# Patient Record
Sex: FEMALE | Race: WHITE | NOT HISPANIC OR LATINO | Employment: OTHER | URBAN - METROPOLITAN AREA
[De-identification: names, ages, dates, MRNs, and addresses within clinical notes are randomized per-mention and may not be internally consistent; named-entity substitution may affect disease eponyms.]

---

## 2017-05-31 ENCOUNTER — OFFICE VISIT (OUTPATIENT)
Dept: INTERNAL MEDICINE | Facility: CLINIC | Age: 67
End: 2017-05-31

## 2017-05-31 VITALS
HEART RATE: 71 BPM | SYSTOLIC BLOOD PRESSURE: 132 MMHG | DIASTOLIC BLOOD PRESSURE: 86 MMHG | RESPIRATION RATE: 18 BRPM | HEIGHT: 65 IN | WEIGHT: 119 LBS | BODY MASS INDEX: 19.83 KG/M2 | OXYGEN SATURATION: 99 %

## 2017-05-31 DIAGNOSIS — R39.9 UTI SYMPTOMS: Primary | ICD-10-CM

## 2017-05-31 DIAGNOSIS — Z12.31 ENCOUNTER FOR SCREENING MAMMOGRAM FOR BREAST CANCER: ICD-10-CM

## 2017-05-31 DIAGNOSIS — K64.0 FIRST DEGREE HEMORRHOIDS: ICD-10-CM

## 2017-05-31 DIAGNOSIS — K62.89 RECTAL PAIN: ICD-10-CM

## 2017-05-31 PROBLEM — E55.9 AVITAMINOSIS D: Status: ACTIVE | Noted: 2017-05-31

## 2017-05-31 PROBLEM — E78.5 HLD (HYPERLIPIDEMIA): Status: ACTIVE | Noted: 2017-05-31

## 2017-05-31 PROBLEM — K64.9 HEMORRHOID: Status: ACTIVE | Noted: 2017-05-31

## 2017-05-31 LAB
BILIRUB BLD-MCNC: NEGATIVE MG/DL
CLARITY, POC: CLEAR
COLOR UR: ABNORMAL
GLUCOSE UR STRIP-MCNC: NEGATIVE MG/DL
KETONES UR QL: NEGATIVE
LEUKOCYTE EST, POC: NEGATIVE
NITRITE UR-MCNC: NEGATIVE MG/ML
PH UR: 5.5 [PH] (ref 5–8)
PROT UR STRIP-MCNC: ABNORMAL MG/DL
RBC # UR STRIP: NEGATIVE /UL
SP GR UR: 1.03 (ref 1–1.03)
UROBILINOGEN UR QL: NORMAL

## 2017-05-31 PROCEDURE — 81003 URINALYSIS AUTO W/O SCOPE: CPT | Performed by: INTERNAL MEDICINE

## 2017-05-31 PROCEDURE — 99214 OFFICE O/P EST MOD 30 MIN: CPT | Performed by: INTERNAL MEDICINE

## 2017-05-31 RX ORDER — NITROFURANTOIN 25; 75 MG/1; MG/1
CAPSULE ORAL
COMMUNITY
Start: 2017-05-25 | End: 2019-02-19

## 2017-05-31 RX ORDER — ONDANSETRON 4 MG/1
TABLET, FILM COATED ORAL
COMMUNITY
Start: 2017-04-21 | End: 2019-02-19

## 2017-06-02 LAB
BACTERIA UR CULT: NORMAL
BACTERIA UR CULT: NORMAL

## 2017-06-05 ENCOUNTER — TELEPHONE (OUTPATIENT)
Dept: INTERNAL MEDICINE | Facility: CLINIC | Age: 67
End: 2017-06-05

## 2017-06-05 NOTE — TELEPHONE ENCOUNTER
----- Message from Judy Sandoval MD sent at 6/4/2017  5:45 PM EDT -----  Please call - her urine culture is negative.

## 2017-06-14 DIAGNOSIS — Z23 NEED FOR SHINGLES VACCINE: Primary | ICD-10-CM

## 2017-06-14 DIAGNOSIS — Z00.00 HEALTH CARE MAINTENANCE: ICD-10-CM

## 2017-06-14 PROCEDURE — 90471 IMMUNIZATION ADMIN: CPT | Performed by: INTERNAL MEDICINE

## 2017-06-14 PROCEDURE — 90736 HZV VACCINE LIVE SUBQ: CPT | Performed by: INTERNAL MEDICINE

## 2017-06-15 ENCOUNTER — RESULTS ENCOUNTER (OUTPATIENT)
Dept: INTERNAL MEDICINE | Facility: CLINIC | Age: 67
End: 2017-06-15

## 2017-06-15 DIAGNOSIS — R39.9 UTI SYMPTOMS: ICD-10-CM

## 2017-06-18 LAB
APPEARANCE UR: CLEAR
BACTERIA #/AREA URNS HPF: NORMAL /HPF
BACTERIA UR CULT: ABNORMAL
BACTERIA UR CULT: ABNORMAL
BILIRUB UR QL STRIP: NEGATIVE
COLOR UR: YELLOW
EPI CELLS #/AREA URNS HPF: NORMAL /HPF
GLUCOSE UR QL: NEGATIVE
HGB UR QL STRIP: NEGATIVE
KETONES UR QL STRIP: NEGATIVE
LEUKOCYTE ESTERASE UR QL STRIP: ABNORMAL
MICRO URNS: ABNORMAL
MUCOUS THREADS URNS QL MICRO: PRESENT /HPF
NITRITE UR QL STRIP: NEGATIVE
OTHER ANTIBIOTIC SUSC ISLT: ABNORMAL
PH UR STRIP: 6 [PH] (ref 5–7.5)
PROT UR QL STRIP: NEGATIVE
RBC #/AREA URNS HPF: NORMAL /HPF
SP GR UR: 1.02 (ref 1–1.03)
URINALYSIS REFLEX: ABNORMAL
UROBILINOGEN UR STRIP-MCNC: 0.2 MG/DL (ref 0.2–1)
WBC #/AREA URNS HPF: NORMAL /HPF

## 2017-06-19 ENCOUNTER — TELEPHONE (OUTPATIENT)
Dept: INTERNAL MEDICINE | Facility: CLINIC | Age: 67
End: 2017-06-19

## 2017-06-19 NOTE — TELEPHONE ENCOUNTER
----- Message from Judy Sandoval MD sent at 6/19/2017  8:26 AM EDT -----  Please call - Her urine was negative for protein.

## 2017-07-21 ENCOUNTER — TELEPHONE (OUTPATIENT)
Dept: INTERNAL MEDICINE | Facility: CLINIC | Age: 67
End: 2017-07-21

## 2017-07-21 NOTE — TELEPHONE ENCOUNTER
3 days ago she was dizzy when she got out of bed, as the day went on it went away.   Has happened the last three nights. As she gets up she can't turn her head without feeling dizzy.     Pt stated that she was outside and doing some swimming and feels like she might have slight congestion/maybe seasonal allergy. Feels her head is stopped up. Recommended a nasal spray with an otc sinus pressure/congestion med. Told to try that over the weekend and if she still feels dizzy in the morning on Monday to give us a call and we would get her an OV scheduled.

## 2018-01-08 ENCOUNTER — TRANSCRIBE ORDERS (OUTPATIENT)
Dept: ADMINISTRATIVE | Facility: HOSPITAL | Age: 68
End: 2018-01-08

## 2018-01-08 DIAGNOSIS — Z12.39 ENCOUNTER FOR SCREENING BREAST EXAMINATION: Primary | ICD-10-CM

## 2018-01-16 ENCOUNTER — CLINICAL SUPPORT (OUTPATIENT)
Dept: INTERNAL MEDICINE | Facility: CLINIC | Age: 68
End: 2018-01-16

## 2018-01-16 DIAGNOSIS — E78.2 MIXED HYPERLIPIDEMIA: ICD-10-CM

## 2018-01-16 DIAGNOSIS — Z00.00 HEALTH CARE MAINTENANCE: Primary | ICD-10-CM

## 2018-01-16 DIAGNOSIS — Z78.0 POSTMENOPAUSAL: Primary | ICD-10-CM

## 2018-01-16 DIAGNOSIS — Z11.59 NEED FOR HEPATITIS C SCREENING TEST: ICD-10-CM

## 2018-01-16 PROCEDURE — 77080 DXA BONE DENSITY AXIAL: CPT | Performed by: INTERNAL MEDICINE

## 2018-01-19 LAB
ALBUMIN SERPL-MCNC: 4.7 G/DL (ref 3.5–5.2)
ALBUMIN/GLOB SERPL: 1.6 G/DL
ALP SERPL-CCNC: 72 U/L (ref 39–117)
ALT SERPL-CCNC: 16 U/L (ref 1–33)
APPEARANCE UR: CLEAR
AST SERPL-CCNC: 21 U/L (ref 1–32)
BACTERIA #/AREA URNS HPF: ABNORMAL /HPF
BACTERIA UR CULT: ABNORMAL
BASOPHILS # BLD MANUAL: 0 10*3/MM3 (ref 0–0.2)
BASOPHILS NFR BLD MANUAL: 0 % (ref 0–1.5)
BILIRUB SERPL-MCNC: 0.5 MG/DL (ref 0.1–1.2)
BILIRUB UR QL STRIP: NEGATIVE
BUN SERPL-MCNC: 17 MG/DL (ref 8–23)
BUN/CREAT SERPL: 19.8 (ref 7–25)
CALCIUM SERPL-MCNC: 9.5 MG/DL (ref 8.6–10.5)
CHLORIDE SERPL-SCNC: 103 MMOL/L (ref 98–107)
CHOLEST SERPL-MCNC: 223 MG/DL (ref 0–200)
CO2 SERPL-SCNC: 24.5 MMOL/L (ref 22–29)
COLOR UR: YELLOW
CREAT SERPL-MCNC: 0.86 MG/DL (ref 0.57–1)
DIFFERENTIAL COMMENT: ABNORMAL
EOSINOPHIL # BLD MANUAL: 0.29 10*3/MM3 (ref 0–0.7)
EOSINOPHIL NFR BLD MANUAL: 4 % (ref 0.3–6.2)
EPI CELLS #/AREA URNS HPF: ABNORMAL /HPF
ERYTHROCYTE [DISTWIDTH] IN BLOOD BY AUTOMATED COUNT: 13.3 % (ref 11.7–13)
GLOBULIN SER CALC-MCNC: 2.9 GM/DL
GLUCOSE SERPL-MCNC: 96 MG/DL (ref 65–99)
GLUCOSE UR QL: NEGATIVE
HCT VFR BLD AUTO: 42.4 % (ref 35.6–45.5)
HCV AB S/CO SERPL IA: 0.1 S/CO RATIO (ref 0–0.9)
HDLC SERPL-MCNC: 67 MG/DL (ref 40–60)
HGB BLD-MCNC: 14.3 G/DL (ref 11.9–15.5)
HGB UR QL STRIP: NEGATIVE
KETONES UR QL STRIP: NEGATIVE
LDLC SERPL CALC-MCNC: 143 MG/DL (ref 0–100)
LEUKOCYTE ESTERASE UR QL STRIP: ABNORMAL
LYMPHOCYTES # BLD MANUAL: 2.97 10*3/MM3 (ref 0.9–4.8)
LYMPHOCYTES NFR BLD MANUAL: 41 % (ref 19.6–45.3)
MCH RBC QN AUTO: 31.9 PG (ref 26.9–32)
MCHC RBC AUTO-ENTMCNC: 33.7 G/DL (ref 32.4–36.3)
MCV RBC AUTO: 94.6 FL (ref 80.5–98.2)
MICRO URNS: ABNORMAL
MONOCYTES # BLD MANUAL: 0.07 10*3/MM3 (ref 0.2–1.2)
MONOCYTES NFR BLD MANUAL: 1 % (ref 5–12)
MUCOUS THREADS URNS QL MICRO: PRESENT /HPF
NEUTROPHILS # BLD MANUAL: 3.92 10*3/MM3 (ref 1.9–8.1)
NEUTROPHILS NFR BLD MANUAL: 54 % (ref 42.7–76)
NITRITE UR QL STRIP: NEGATIVE
OTHER ANTIBIOTIC SUSC ISLT: ABNORMAL
PH UR STRIP: 5 [PH] (ref 5–7.5)
PLATELET # BLD AUTO: 284 10*3/MM3 (ref 140–500)
PLATELET BLD QL SMEAR: ABNORMAL
POTASSIUM SERPL-SCNC: 4.5 MMOL/L (ref 3.5–5.2)
PROT SERPL-MCNC: 7.6 G/DL (ref 6–8.5)
PROT UR QL STRIP: NEGATIVE
RBC # BLD AUTO: 4.48 10*6/MM3 (ref 3.9–5.2)
RBC #/AREA URNS HPF: ABNORMAL /HPF
RBC MORPH BLD: ABNORMAL
SODIUM SERPL-SCNC: 143 MMOL/L (ref 136–145)
SP GR UR: 1.03 (ref 1–1.03)
TRIGL SERPL-MCNC: 64 MG/DL (ref 0–150)
TSH SERPL DL<=0.005 MIU/L-ACNC: 2.74 MIU/ML (ref 0.27–4.2)
URINALYSIS REFLEX: ABNORMAL
UROBILINOGEN UR STRIP-MCNC: 0.2 MG/DL (ref 0.2–1)
VLDLC SERPL CALC-MCNC: 12.8 MG/DL (ref 5–40)
WBC # BLD AUTO: 7.25 10*3/MM3 (ref 4.5–10.7)
WBC #/AREA URNS HPF: ABNORMAL /HPF

## 2018-01-23 ENCOUNTER — OFFICE VISIT (OUTPATIENT)
Dept: INTERNAL MEDICINE | Facility: CLINIC | Age: 68
End: 2018-01-23

## 2018-01-23 VITALS
HEART RATE: 64 BPM | WEIGHT: 122 LBS | DIASTOLIC BLOOD PRESSURE: 70 MMHG | RESPIRATION RATE: 18 BRPM | BODY MASS INDEX: 20.83 KG/M2 | OXYGEN SATURATION: 99 % | SYSTOLIC BLOOD PRESSURE: 122 MMHG | HEIGHT: 64 IN

## 2018-01-23 DIAGNOSIS — Z83.2 FAMILY HISTORY OF FACTOR V DEFICIENCY: ICD-10-CM

## 2018-01-23 DIAGNOSIS — Z00.00 HEALTH MAINTENANCE EXAMINATION: Primary | ICD-10-CM

## 2018-01-23 DIAGNOSIS — R79.89 ABNORMAL CBC: ICD-10-CM

## 2018-01-23 PROCEDURE — 99397 PER PM REEVAL EST PAT 65+ YR: CPT | Performed by: INTERNAL MEDICINE

## 2018-01-23 RX ORDER — LIFITEGRAST 50 MG/ML
SOLUTION/ DROPS OPHTHALMIC
Refills: 0 | COMMUNITY
Start: 2017-12-10 | End: 2019-02-19

## 2018-01-23 NOTE — PROGRESS NOTES
Chief Complaint  Lori Tony is a 67 y.o. female who presents for Annual Exam (CPE)  .    History of Present Illness   Lori is here for routine CPE.  No new concerns.    Mammo:3/28/15 she has one scheduled on Saturday  Pap: n/a  DEXA: 1/2018  C-scope:10/2014 due 2019  Flu shot - declines  Pneumonia shots - declines  She is not big on shots.  She tries to avoid doctors.  She doesn't do regular mammograms.      Exercise: power yoga, tennis numerous days a week, swims, walk/runs    Immunization History   Administered Date(s) Administered   • Tdap 01/01/2010   • Zoster 06/14/2017       Review of Systems   Constitution: Negative for chills, fever and malaise/fatigue.   HENT: Negative for hearing loss, hoarse voice and sore throat.    Eyes: Negative for blurred vision, double vision and visual disturbance.   Cardiovascular: Negative for chest pain, leg swelling and palpitations.   Respiratory: Negative for cough and shortness of breath.    Endocrine: Negative for polydipsia and polyuria.   Hematologic/Lymphatic: Does not bruise/bleed easily.   Skin: Negative for rash and suspicious lesions.   Musculoskeletal: Negative for arthritis and myalgias.   Gastrointestinal: Negative for bloating, abdominal pain, change in bowel habit, constipation, diarrhea, dysphagia, hematochezia, melena, nausea and vomiting.   Genitourinary: Negative for dysuria and hematuria.   Neurological: Negative for dizziness, headaches and light-headedness.   Psychiatric/Behavioral: Negative for depression. The patient is not nervous/anxious.        Patient Active Problem List   Diagnosis   • HLD (hyperlipidemia)   • Avitaminosis D   • Rectal pain   • Hemorrhoid       No past medical history on file.    No past surgical history on file.    Family History   Problem Relation Age of Onset   • Dementia Mother    • Hypothyroidism Mother    • Heart disease Mother      pacemaker   • Deep vein thrombosis Father    • Dementia Father    • Glaucoma Father    •  "Macular degeneration Father    • Factor V Leiden deficiency Sister    • Deep vein thrombosis Sister      unprovoked   • Heart disease Maternal Grandmother      MI at 73   • Factor V Leiden deficiency Sister    • Glaucoma Sister        Social History     Social History   • Marital status:      Spouse name: N/A   • Number of children: 1   • Years of education: N/A     Occupational History   • Not on file.     Social History Main Topics   • Smoking status: Never Smoker   • Smokeless tobacco: Never Used   • Alcohol use Yes      Comment: occasionally   • Drug use: No   • Sexual activity: Not on file     Other Topics Concern   • Not on file     Social History Narrative   • No narrative on file       Current Outpatient Prescriptions on File Prior to Visit   Medication Sig Dispense Refill   • hydrocortisone (PROCTOSOL HC) 2.5 % rectal cream Insert  into the rectum 2 (Two) Times a Day. 28.35 g 0   • nitrofurantoin, macrocrystal-monohydrate, (MACROBID) 100 MG capsule      • ondansetron (ZOFRAN) 4 MG tablet        No current facility-administered medications on file prior to visit.        Allergies   Allergen Reactions   • Carbocaine Preservative-Free  [Mepivacaine]    • Sulfa Antibiotics        Vitals:    01/23/18 1249   BP: 122/70   Pulse: 64   Resp: 18   SpO2: 99%   Weight: 55.3 kg (122 lb)   Height: 161.3 cm (63.5\")       Body mass index is 21.27 kg/(m^2).    Objective   Physical Exam   Constitutional: She is oriented to person, place, and time. She appears well-developed and well-nourished. No distress.   HENT:   Head: Normocephalic and atraumatic.   Nose: Nose normal.   Mouth/Throat: Oropharynx is clear and moist.   Eyes: Conjunctivae and EOM are normal. Pupils are equal, round, and reactive to light. No scleral icterus.   Neck: Normal range of motion. Neck supple. No thyromegaly present.   Cardiovascular: Normal rate, regular rhythm and normal heart sounds.  Exam reveals no gallop and no friction rub.    No " murmur heard.  Pulses:       Carotid pulses are 2+ on the right side, and 2+ on the left side.       Femoral pulses are 2+ on the right side, and 2+ on the left side.       Dorsalis pedis pulses are 2+ on the right side, and 2+ on the left side.        Posterior tibial pulses are 2+ on the right side, and 2+ on the left side.   Pulmonary/Chest: Effort normal and breath sounds normal. No respiratory distress. She has no wheezes. She has no rales. Right breast exhibits no mass and no nipple discharge. Left breast exhibits no mass and no nipple discharge.   Abdominal: Soft. Bowel sounds are normal. She exhibits no distension and no mass. There is no tenderness.   Musculoskeletal: Normal range of motion. She exhibits no edema.       Vascular Status -  Her exam exhibits no right foot edema. Her exam exhibits no left foot edema.   Skin Integrity  -  Her right foot skin is intact.     Lori 's left foot skin is intact. .  Lymphadenopathy:     She has no cervical adenopathy.     She has no axillary adenopathy.        Right: No inguinal and no supraclavicular adenopathy present.        Left: No inguinal and no supraclavicular adenopathy present.   Neurological: She is alert and oriented to person, place, and time. She has normal reflexes. No cranial nerve deficit.   Skin: Skin is warm and dry.   Psychiatric: She has a normal mood and affect. Her speech is normal and behavior is normal. Judgment and thought content normal. Cognition and memory are normal.   Vitals reviewed.        Results for orders placed or performed in visit on 01/16/18   Urine culture, Comprehensive   Result Value Ref Range    Urine Culture Final report (A)     Result 1 Escherichia coli (A)     Result 2 Morganella morganii (A)     Susceptibility Testing Comment    Comprehensive Metabolic Panel   Result Value Ref Range    Glucose 96 65 - 99 mg/dL    BUN 17 8 - 23 mg/dL    Creatinine 0.86 0.57 - 1.00 mg/dL    eGFR Non African Am 66 >60 mL/min/1.73    eGFR   Am 80 >60 mL/min/1.73    BUN/Creatinine Ratio 19.8 7.0 - 25.0    Sodium 143 136 - 145 mmol/L    Potassium 4.5 3.5 - 5.2 mmol/L    Chloride 103 98 - 107 mmol/L    Total CO2 24.5 22.0 - 29.0 mmol/L    Calcium 9.5 8.6 - 10.5 mg/dL    Total Protein 7.6 6.0 - 8.5 g/dL    Albumin 4.70 3.50 - 5.20 g/dL    Globulin 2.9 gm/dL    A/G Ratio 1.6 g/dL    Total Bilirubin 0.5 0.1 - 1.2 mg/dL    Alkaline Phosphatase 72 39 - 117 U/L    AST (SGOT) 21 1 - 32 U/L    ALT (SGPT) 16 1 - 33 U/L   Lipid Panel   Result Value Ref Range    Total Cholesterol 223 (H) 0 - 200 mg/dL    Triglycerides 64 0 - 150 mg/dL    HDL Cholesterol 67 (H) 40 - 60 mg/dL    VLDL Cholesterol 12.8 5 - 40 mg/dL    LDL Cholesterol  143 (H) 0 - 100 mg/dL   TSH Rfx On Abnormal To Free T4   Result Value Ref Range    TSH 2.74 0.27 - 4.2 mIU/mL   Urinalysis With / Culture If Indicated - Urine, Clean Catch   Result Value Ref Range    Specific Gravity, UA 1.026 1.005 - 1.030    pH, UA 5.0 5.0 - 7.5    Color, UA Yellow Yellow    Appearance, UA Clear Clear    Leukocytes, UA 1+ (A) Negative    Protein Negative Negative/Trace    Glucose, UA Negative Negative    Ketones Negative Negative    Blood, UA Negative Negative    Bilirubin, UA Negative Negative    Urobilinogen, UA 0.2 0.2 - 1.0 mg/dL    Nitrite, UA Negative Negative    Microscopic Examination See below:     Urinalysis Reflex Comment    Hepatitis C Antibody   Result Value Ref Range    Hep C Virus Ab 0.1 0.0 - 0.9 s/co ratio   Microscopic Examination   Result Value Ref Range    WBC, UA 6-10 (A) 0 - 5 /hpf    RBC, UA 0-2 0 - 2 /hpf    Epithelial Cells (non renal) 0-10 0 - 10 /hpf    Mucus, UA Present Not Estab. /hpf    Bacteria, UA None seen None seen/Few /hpf   Manual Differential   Result Value Ref Range    Neutrophil Rel % 54.0 42.7 - 76.0 %    Lymphocyte Rel % 41.0 19.6 - 45.3 %    Monocyte Rel % 1.0 (L) 5.0 - 12.0 %    Eosinophil Rel % 4.0 0.3 - 6.2 %    Basophil Rel % 0.0 0.0 - 1.5 %    Neutrophils Absolute  3.92 1.90 - 8.10 10*3/mm3    Lymphocytes Absolute 2.97 0.90 - 4.80 10*3/mm3    Monocytes Absolute 0.07 (L) 0.20 - 1.20 10*3/mm3    Eosinophil Abs 0.29 0.00 - 0.70 10*3/mm3    Basophils Absolute 0.00 0.00 - 0.20 10*3/mm3    Differential Comment Comment     Comment Comment     Plt Comment Comment    CBC & Differential   Result Value Ref Range    WBC 7.25 4.50 - 10.70 10*3/mm3    RBC 4.48 3.90 - 5.20 10*6/mm3    Hemoglobin 14.3 11.9 - 15.5 g/dL    Hematocrit 42.4 35.6 - 45.5 %    MCV 94.6 80.5 - 98.2 fL    MCH 31.9 26.9 - 32.0 pg    MCHC 33.7 32.4 - 36.3 g/dL    RDW 13.3 (H) 11.7 - 13.0 %    Platelets 284 140 - 500 10*3/mm3       Assessment/Plan   Diagnoses and all orders for this visit:    Health maintenance examination  -     CBC & Differential; Future  -     Factor 5 Leiden; Future    Abnormal CBC  -     CBC & Differential; Future    Family history of factor V deficiency  -     Factor 5 Leiden; Future    Other orders  -     XIIDRA 5 % solution; INSTILL 1 DROP IN EACH EYE TWO TIMES A DAY        Discussion/Summary  Lori is here for routine CPE.  She is a very healthy 67 year old who takes good care of herself.  She declines immunizations.  I have encouraged her to get yearly mammograms.  Her sisters have been diagnosed with Factor V Leiden deficiency.  We are going to screen her when she repeats her CBC in a month.  Her monocytes were abnormally low.  Continue regular exercise.  We discussed her bone density which is normal.  It has declined in the last 7 years but is still in the normal range.  She does a lot of weight bearing exercise.        Return in about 1 month (around 2/23/2018) for labs only; one year CPE.

## 2018-01-27 ENCOUNTER — HOSPITAL ENCOUNTER (OUTPATIENT)
Dept: MAMMOGRAPHY | Facility: HOSPITAL | Age: 68
Discharge: HOME | End: 2018-01-27
Admitting: INTERNAL MEDICINE

## 2018-01-27 DIAGNOSIS — Z12.39 ENCOUNTER FOR SCREENING BREAST EXAMINATION: ICD-10-CM

## 2018-01-27 PROCEDURE — 77067 SCR MAMMO BI INCL CAD: CPT

## 2018-02-16 DIAGNOSIS — Z00.00 HEALTH MAINTENANCE EXAMINATION: ICD-10-CM

## 2018-02-16 DIAGNOSIS — R79.89 ABNORMAL CBC: ICD-10-CM

## 2018-02-16 DIAGNOSIS — Z83.2 FAMILY HISTORY OF FACTOR V DEFICIENCY: ICD-10-CM

## 2018-02-23 LAB
BASOPHILS # BLD AUTO: 0.03 10*3/MM3 (ref 0–0.2)
BASOPHILS NFR BLD AUTO: 0.4 % (ref 0–1.5)
EOSINOPHIL # BLD AUTO: 0.16 10*3/MM3 (ref 0–0.7)
EOSINOPHIL NFR BLD AUTO: 2.3 % (ref 0.3–6.2)
ERYTHROCYTE [DISTWIDTH] IN BLOOD BY AUTOMATED COUNT: 13.1 % (ref 11.7–13)
F5 GENE MUT ANL BLD/T: ABNORMAL
HCT VFR BLD AUTO: 43.8 % (ref 35.6–45.5)
HGB BLD-MCNC: 14.1 G/DL (ref 11.9–15.5)
IMM GRANULOCYTES # BLD: 0 10*3/MM3 (ref 0–0.03)
IMM GRANULOCYTES NFR BLD: 0 % (ref 0–0.5)
LABORATORY COMMENT REPORT: ABNORMAL
LYMPHOCYTES # BLD AUTO: 2.46 10*3/MM3 (ref 0.9–4.8)
LYMPHOCYTES NFR BLD AUTO: 35.5 % (ref 19.6–45.3)
MCH RBC QN AUTO: 31.5 PG (ref 26.9–32)
MCHC RBC AUTO-ENTMCNC: 32.2 G/DL (ref 32.4–36.3)
MCV RBC AUTO: 97.8 FL (ref 80.5–98.2)
MONOCYTES # BLD AUTO: 0.43 10*3/MM3 (ref 0.2–1.2)
MONOCYTES NFR BLD AUTO: 6.2 % (ref 5–12)
NEUTROPHILS # BLD AUTO: 3.84 10*3/MM3 (ref 1.9–8.1)
NEUTROPHILS NFR BLD AUTO: 55.6 % (ref 42.7–76)
PLATELET # BLD AUTO: 283 10*3/MM3 (ref 140–500)
RBC # BLD AUTO: 4.48 10*6/MM3 (ref 3.9–5.2)
WBC # BLD AUTO: 6.92 10*3/MM3 (ref 4.5–10.7)

## 2018-02-26 PROBLEM — D68.51 HETEROZYGOUS FACTOR V LEIDEN MUTATION: Status: ACTIVE | Noted: 2018-02-26

## 2019-02-11 DIAGNOSIS — E55.9 AVITAMINOSIS D: ICD-10-CM

## 2019-02-11 DIAGNOSIS — E78.2 MIXED HYPERLIPIDEMIA: ICD-10-CM

## 2019-02-11 DIAGNOSIS — Z00.00 HEALTHCARE MAINTENANCE: Primary | ICD-10-CM

## 2019-02-13 LAB
25(OH)D3+25(OH)D2 SERPL-MCNC: 25.7 NG/ML (ref 30–100)
ALBUMIN SERPL-MCNC: 4.5 G/DL (ref 3.6–4.8)
ALBUMIN/GLOB SERPL: 1.8 {RATIO} (ref 1.2–2.2)
ALP SERPL-CCNC: 74 IU/L (ref 39–117)
ALT SERPL-CCNC: 19 IU/L (ref 0–32)
APPEARANCE UR: CLEAR
AST SERPL-CCNC: 24 IU/L (ref 0–40)
BACTERIA #/AREA URNS HPF: NORMAL /[HPF]
BASOPHILS # BLD AUTO: 0 X10E3/UL (ref 0–0.2)
BASOPHILS NFR BLD AUTO: 0 %
BILIRUB SERPL-MCNC: 0.7 MG/DL (ref 0–1.2)
BILIRUB UR QL STRIP: NEGATIVE
BUN SERPL-MCNC: 19 MG/DL (ref 8–27)
BUN/CREAT SERPL: 20 (ref 12–28)
CALCIUM SERPL-MCNC: 9.5 MG/DL (ref 8.7–10.3)
CHLORIDE SERPL-SCNC: 102 MMOL/L (ref 96–106)
CHOLEST SERPL-MCNC: 224 MG/DL (ref 100–199)
CO2 SERPL-SCNC: 25 MMOL/L (ref 20–29)
COLOR UR: YELLOW
CREAT SERPL-MCNC: 0.93 MG/DL (ref 0.57–1)
EOSINOPHIL # BLD AUTO: 0.1 X10E3/UL (ref 0–0.4)
EOSINOPHIL NFR BLD AUTO: 1 %
EPI CELLS #/AREA URNS HPF: NORMAL /HPF
ERYTHROCYTE [DISTWIDTH] IN BLOOD BY AUTOMATED COUNT: 13.9 % (ref 12.3–15.4)
GLOBULIN SER CALC-MCNC: 2.5 G/DL (ref 1.5–4.5)
GLUCOSE SERPL-MCNC: 94 MG/DL (ref 65–99)
GLUCOSE UR QL: NEGATIVE
HBA1C MFR BLD: 5.5 % (ref 4.8–5.6)
HCT VFR BLD AUTO: 42 % (ref 34–46.6)
HDLC SERPL-MCNC: 63 MG/DL
HGB BLD-MCNC: 13.8 G/DL (ref 11.1–15.9)
HGB UR QL STRIP: NEGATIVE
IMM GRANULOCYTES # BLD AUTO: 0 X10E3/UL (ref 0–0.1)
IMM GRANULOCYTES NFR BLD AUTO: 0 %
KETONES UR QL STRIP: NEGATIVE
LDLC SERPL CALC-MCNC: 147 MG/DL (ref 0–99)
LEUKOCYTE ESTERASE UR QL STRIP: NEGATIVE
LYMPHOCYTES # BLD AUTO: 2.1 X10E3/UL (ref 0.7–3.1)
LYMPHOCYTES NFR BLD AUTO: 35 %
MCH RBC QN AUTO: 31.1 PG (ref 26.6–33)
MCHC RBC AUTO-ENTMCNC: 32.9 G/DL (ref 31.5–35.7)
MCV RBC AUTO: 95 FL (ref 79–97)
MICRO URNS: NORMAL
MICRO URNS: NORMAL
MONOCYTES # BLD AUTO: 0.5 X10E3/UL (ref 0.1–0.9)
MONOCYTES NFR BLD AUTO: 8 %
MUCOUS THREADS URNS QL MICRO: PRESENT
NEUTROPHILS # BLD AUTO: 3.3 X10E3/UL (ref 1.4–7)
NEUTROPHILS NFR BLD AUTO: 56 %
NITRITE UR QL STRIP: NEGATIVE
PH UR STRIP: 6.5 [PH] (ref 5–7.5)
PLATELET # BLD AUTO: 281 X10E3/UL (ref 150–379)
POTASSIUM SERPL-SCNC: 5 MMOL/L (ref 3.5–5.2)
PROT SERPL-MCNC: 7 G/DL (ref 6–8.5)
PROT UR QL STRIP: NEGATIVE
RBC # BLD AUTO: 4.44 X10E6/UL (ref 3.77–5.28)
RBC #/AREA URNS HPF: NORMAL /HPF
SODIUM SERPL-SCNC: 142 MMOL/L (ref 134–144)
SP GR UR: 1.02 (ref 1–1.03)
TRIGL SERPL-MCNC: 71 MG/DL (ref 0–149)
TSH SERPL DL<=0.005 MIU/L-ACNC: 3.74 UIU/ML (ref 0.45–4.5)
URINALYSIS REFLEX: NORMAL
UROBILINOGEN UR STRIP-MCNC: 0.2 MG/DL (ref 0.2–1)
VLDLC SERPL CALC-MCNC: 14 MG/DL (ref 5–40)
WBC # BLD AUTO: 5.9 X10E3/UL (ref 3.4–10.8)
WBC #/AREA URNS HPF: NORMAL /HPF

## 2019-02-19 ENCOUNTER — OFFICE VISIT (OUTPATIENT)
Dept: INTERNAL MEDICINE | Facility: CLINIC | Age: 69
End: 2019-02-19

## 2019-02-19 VITALS
OXYGEN SATURATION: 99 % | HEART RATE: 62 BPM | WEIGHT: 120 LBS | RESPIRATION RATE: 18 BRPM | HEIGHT: 64 IN | SYSTOLIC BLOOD PRESSURE: 110 MMHG | DIASTOLIC BLOOD PRESSURE: 64 MMHG | BODY MASS INDEX: 20.49 KG/M2

## 2019-02-19 DIAGNOSIS — Z00.00 HEALTH MAINTENANCE EXAMINATION: Primary | ICD-10-CM

## 2019-02-19 DIAGNOSIS — Z12.31 ENCOUNTER FOR SCREENING MAMMOGRAM FOR BREAST CANCER: ICD-10-CM

## 2019-02-19 DIAGNOSIS — Z12.11 COLON CANCER SCREENING: ICD-10-CM

## 2019-02-19 PROCEDURE — 99397 PER PM REEVAL EST PAT 65+ YR: CPT | Performed by: INTERNAL MEDICINE

## 2019-02-19 NOTE — PROGRESS NOTES
Chief Complaint  Lori Tony is a 68 y.o. female who presents for Annual Exam (CPE)  .    History of Present Illness   Lori is here for routine cpe.  No new health concerns.  She is retiring from [employer] in July.  Mammo: Is due, but has not had one yet.   DEXA: 1/2018  C-scope: 10/3/2014, repeat five years   Flu shot: declines    Exercise: Power yoga, plays tennis, works with a , goes to [employer] to swim (about a mile), plays golf, gets some form of exercise almost daily.     Immunization History   Administered Date(s) Administered   • Tdap 01/01/2010   • Zostavax 06/14/2017       Review of Systems   Constitution: Negative for chills, fever and malaise/fatigue.   HENT: Negative for hearing loss, hoarse voice and sore throat.    Eyes: Negative for blurred vision, double vision and visual disturbance.   Cardiovascular: Negative for chest pain, leg swelling and palpitations.   Respiratory: Negative for cough and shortness of breath.    Endocrine: Negative for polydipsia and polyuria.   Hematologic/Lymphatic: Does not bruise/bleed easily.   Skin: Negative for rash and suspicious lesions.   Musculoskeletal: Negative for arthritis and myalgias.   Gastrointestinal: Positive for heartburn. Negative for bloating, abdominal pain, change in bowel habit, constipation, diarrhea, dysphagia, hematochezia, melena, nausea and vomiting.   Genitourinary: Negative for dysuria and hematuria.   Neurological: Negative for dizziness, headaches and light-headedness.   Psychiatric/Behavioral: Negative for depression. The patient is not nervous/anxious.        Patient Active Problem List   Diagnosis   • HLD (hyperlipidemia)   • Avitaminosis D   • Rectal pain   • Hemorrhoid   • Heterozygous factor V Leiden mutation (CMS/HCC)       No past medical history on file.    Past Surgical History:   Procedure Laterality Date   • HYSTERECTOMY         Family History   Problem Relation Age of Onset   • Dementia Mother    • Hypothyroidism  "Mother    • Heart disease Mother         pacemaker   • Deep vein thrombosis Father    • Dementia Father    • Glaucoma Father    • Macular degeneration Father    • Factor V Leiden deficiency Sister    • Deep vein thrombosis Sister         unprovoked   • Heart disease Maternal Grandmother         MI at 73   • Factor V Leiden deficiency Sister    • Glaucoma Sister        Social History     Socioeconomic History   • Marital status:      Spouse name: Not on file   • Number of children: 1   • Years of education: Not on file   • Highest education level: Not on file   Social Needs   • Financial resource strain: Not on file   • Food insecurity - worry: Not on file   • Food insecurity - inability: Not on file   • Transportation needs - medical: Not on file   • Transportation needs - non-medical: Not on file   Occupational History   • Not on file   Tobacco Use   • Smoking status: Never Smoker   • Smokeless tobacco: Never Used   Substance and Sexual Activity   • Alcohol use: Yes     Comment: occasionally   • Drug use: No   • Sexual activity: Not on file   Other Topics Concern   • Not on file   Social History Narrative   • Not on file       Current Outpatient Medications on File Prior to Visit   Medication Sig Dispense Refill   • [DISCONTINUED] hydrocortisone (PROCTOSOL HC) 2.5 % rectal cream Insert  into the rectum 2 (Two) Times a Day. 28.35 g 0   • [DISCONTINUED] nitrofurantoin, macrocrystal-monohydrate, (MACROBID) 100 MG capsule      • [DISCONTINUED] ondansetron (ZOFRAN) 4 MG tablet      • [DISCONTINUED] XIIDRA 5 % solution INSTILL 1 DROP IN EACH EYE TWO TIMES A DAY  0     No current facility-administered medications on file prior to visit.        Allergies   Allergen Reactions   • Carbocaine Preservative-Free  [Mepivacaine]    • Sulfa Antibiotics        Vitals:    02/19/19 0809   BP: 110/64   Pulse: 62   Resp: 18   SpO2: 99%   Weight: 54.4 kg (120 lb)   Height: 161.3 cm (63.5\")       Body mass index is 20.92 " kg/m².    Objective   Physical Exam   Constitutional: She is oriented to person, place, and time. She appears well-developed and well-nourished. No distress.   HENT:   Head: Normocephalic and atraumatic.   Nose: Nose normal.   Mouth/Throat: Oropharynx is clear and moist.   Eyes: Conjunctivae and EOM are normal. Pupils are equal, round, and reactive to light. No scleral icterus.   Neck: Normal range of motion. Neck supple. No thyromegaly present.   Cardiovascular: Normal rate, regular rhythm and normal heart sounds. Exam reveals no gallop and no friction rub.   No murmur heard.  Pulses:       Carotid pulses are 2+ on the right side, and 2+ on the left side.       Femoral pulses are 2+ on the right side, and 2+ on the left side.       Dorsalis pedis pulses are 2+ on the right side, and 2+ on the left side.        Posterior tibial pulses are 2+ on the right side, and 2+ on the left side.   Pulmonary/Chest: Effort normal and breath sounds normal. No respiratory distress. She has no wheezes. She has no rales. Right breast exhibits no mass and no nipple discharge. Left breast exhibits no mass and no nipple discharge.   Abdominal: Soft. Bowel sounds are normal. She exhibits no distension and no mass. There is no tenderness.   Musculoskeletal: Normal range of motion. She exhibits no edema.     Vascular Status -  Her right foot exhibits no edema. Her left foot exhibits no edema.  Skin Integrity  -  Her right foot skin is intact.Her left foot skin is intact..  Lymphadenopathy:     She has no cervical adenopathy.     She has no axillary adenopathy.        Right: No inguinal and no supraclavicular adenopathy present.        Left: No inguinal and no supraclavicular adenopathy present.   Neurological: She is alert and oriented to person, place, and time. She has normal reflexes. No cranial nerve deficit.   Skin: Skin is warm and dry.   Psychiatric: She has a normal mood and affect. Her speech is normal and behavior is normal.  Judgment and thought content normal. Cognition and memory are normal.   Vitals reviewed.        Results for orders placed or performed in visit on 02/11/19   Comprehensive Metabolic Panel   Result Value Ref Range    Glucose 94 65 - 99 mg/dL    BUN 19 8 - 27 mg/dL    Creatinine 0.93 0.57 - 1.00 mg/dL    eGFR Non African Am 64 >59 mL/min/1.73    eGFR African Am 74 >59 mL/min/1.73    BUN/Creatinine Ratio 20 12 - 28    Sodium 142 134 - 144 mmol/L    Potassium 5.0 3.5 - 5.2 mmol/L    Chloride 102 96 - 106 mmol/L    Total CO2 25 20 - 29 mmol/L    Calcium 9.5 8.7 - 10.3 mg/dL    Total Protein 7.0 6.0 - 8.5 g/dL    Albumin 4.5 3.6 - 4.8 g/dL    Globulin 2.5 1.5 - 4.5 g/dL    A/G Ratio 1.8 1.2 - 2.2    Total Bilirubin 0.7 0.0 - 1.2 mg/dL    Alkaline Phosphatase 74 39 - 117 IU/L    AST (SGOT) 24 0 - 40 IU/L    ALT (SGPT) 19 0 - 32 IU/L   Lipid Panel   Result Value Ref Range    Total Cholesterol 224 (H) 100 - 199 mg/dL    Triglycerides 71 0 - 149 mg/dL    HDL Cholesterol 63 >39 mg/dL    VLDL Cholesterol 14 5 - 40 mg/dL    LDL Cholesterol  147 (H) 0 - 99 mg/dL   TSH Rfx On Abnormal To Free T4   Result Value Ref Range    TSH 3.740 0.450 - 4.500 uIU/mL   Vitamin D 25 Hydroxy   Result Value Ref Range    25 Hydroxy, Vitamin D 25.7 (L) 30.0 - 100.0 ng/mL   Hemoglobin A1c   Result Value Ref Range    Hemoglobin A1C 5.5 4.8 - 5.6 %   Urinalysis With Culture If Indicated - Urine, Clean Catch   Result Value Ref Range    Specific Gravity, UA 1.025 1.005 - 1.030    pH, UA 6.5 5.0 - 7.5    Color, UA Yellow Yellow    Appearance, UA Clear Clear    Leukocytes, UA Negative Negative    Protein Negative Negative/Trace    Glucose, UA Negative Negative    Ketones Negative Negative    Blood, UA Negative Negative    Bilirubin, UA Negative Negative    Urobilinogen, UA 0.2 0.2 - 1.0 mg/dL    Nitrite, UA Negative Negative    Microscopic Examination Comment     Microscopic Examination See below:     Urinalysis Reflex Comment    Microscopic Examination -    Result Value Ref Range    WBC, UA 0-5 0 - 5 /hpf    RBC, UA 0-2 0 - 2 /hpf    Epithelial Cells (non renal) 0-10 0 - 10 /hpf    Mucus, UA Present Not Estab.    Bacteria, UA None seen None seen/Few   CBC & Differential   Result Value Ref Range    WBC 5.9 3.4 - 10.8 x10E3/uL    RBC 4.44 3.77 - 5.28 x10E6/uL    Hemoglobin 13.8 11.1 - 15.9 g/dL    Hematocrit 42.0 34.0 - 46.6 %    MCV 95 79 - 97 fL    MCH 31.1 26.6 - 33.0 pg    MCHC 32.9 31.5 - 35.7 g/dL    RDW 13.9 12.3 - 15.4 %    Platelets 281 150 - 379 x10E3/uL    Neutrophil Rel % 56 Not Estab. %    Lymphocyte Rel % 35 Not Estab. %    Monocyte Rel % 8 Not Estab. %    Eosinophil Rel % 1 Not Estab. %    Basophil Rel % 0 Not Estab. %    Neutrophils Absolute 3.3 1.4 - 7.0 x10E3/uL    Lymphocytes Absolute 2.1 0.7 - 3.1 x10E3/uL    Monocytes Absolute 0.5 0.1 - 0.9 x10E3/uL    Eosinophils Absolute 0.1 0.0 - 0.4 x10E3/uL    Basophils Absolute 0.0 0.0 - 0.2 x10E3/uL    Immature Granulocyte Rel % 0 Not Estab. %    Immature Grans Absolute 0.0 0.0 - 0.1 x10E3/uL       Assessment/Plan   Diagnoses and all orders for this visit:    Health maintenance examination    Encounter for screening mammogram for breast cancer  -     Mammo Screening Bilateral With CAD; Future    Colon cancer screening  -     Ambulatory Referral to General Surgery        Discussion/Summary  Lori is here for routine CPE.  She is doing very well.  She's due for mammo.  Encouraged to set this up.  Order placed.  She is due for c-scope in October.  Referral placed for that as well.  Labs all look good.  Continue regular exercise.         Return in about 1 year (around 2/19/2020) for Annual physical, with fasting labs prior.

## 2019-02-20 ENCOUNTER — TRANSCRIBE ORDERS (OUTPATIENT)
Dept: ADMINISTRATIVE | Facility: HOSPITAL | Age: 69
End: 2019-02-20

## 2019-02-20 DIAGNOSIS — Z12.31 SCREENING MAMMOGRAM, ENCOUNTER FOR: Primary | ICD-10-CM

## 2019-04-20 ENCOUNTER — HOSPITAL ENCOUNTER (OUTPATIENT)
Dept: MAMMOGRAPHY | Facility: HOSPITAL | Age: 69
Discharge: HOME | End: 2019-04-20
Admitting: INTERNAL MEDICINE

## 2019-04-20 DIAGNOSIS — Z12.31 SCREENING MAMMOGRAM, ENCOUNTER FOR: ICD-10-CM

## 2019-04-20 PROCEDURE — 77067 SCR MAMMO BI INCL CAD: CPT

## 2019-04-20 PROCEDURE — 77063 BREAST TOMOSYNTHESIS BI: CPT

## 2019-11-05 ENCOUNTER — OFFICE VISIT (OUTPATIENT)
Dept: FAMILY MEDICINE CLINIC | Facility: CLINIC | Age: 69
End: 2019-11-05

## 2019-11-05 VITALS
SYSTOLIC BLOOD PRESSURE: 110 MMHG | BODY MASS INDEX: 20.16 KG/M2 | DIASTOLIC BLOOD PRESSURE: 70 MMHG | HEIGHT: 65 IN | HEART RATE: 63 BPM | WEIGHT: 121 LBS | OXYGEN SATURATION: 98 % | TEMPERATURE: 98 F

## 2019-11-05 DIAGNOSIS — R94.31 ABNORMAL EKG: ICD-10-CM

## 2019-11-05 DIAGNOSIS — E78.2 MIXED HYPERLIPIDEMIA: ICD-10-CM

## 2019-11-05 DIAGNOSIS — Z00.00 WELCOME TO MEDICARE PREVENTIVE VISIT: Primary | ICD-10-CM

## 2019-11-05 PROCEDURE — G0402 INITIAL PREVENTIVE EXAM: HCPCS | Performed by: FAMILY MEDICINE

## 2019-11-05 PROCEDURE — G0403 EKG FOR INITIAL PREVENT EXAM: HCPCS | Performed by: FAMILY MEDICINE

## 2019-11-05 NOTE — PROGRESS NOTES
The ABCs of the Annual Wellness Visit  Welcome to Medicare Visit    Chief Complaint   Patient presents with   • Establish Care     [facility] patient of Dr. Sandoval       Subjective   History of Present Illness:  Lori Tony is a 69 y.o. female who presents for a  Welcome to Medicare Visit.  Patient also here to discuss her labs done this year February and bone density results.    HEALTH RISK ASSESSMENT    Recent Hospitalizations:  No hospitalization(s) within the last year.    Current Medical Providers:  Patient Care Team:  Estella Timmons MD as PCP - General (Family Medicine)    Smoking Status:  Social History     Tobacco Use   Smoking Status Never Smoker   Smokeless Tobacco Never Used       Alcohol Consumption:  Social History     Substance and Sexual Activity   Alcohol Use Yes    Comment: occasionally       Depression Screen:   PHQ-2/PHQ-9 Depression Screening 11/5/2019   Little interest or pleasure in doing things 0   Feeling down, depressed, or hopeless 0   Total Score 0       Fall Risk Screen:  HOMA Fall Risk Assessment was completed, and patient is at LOW risk for falls.Assessment completed on:11/5/2019    Health Habits and Functional and Cognitive Screening:  No flowsheet data found.      Does the patient have evidence of cognitive impairment? No    Asprin use counseling:Does not need ASA (and currently is not on it)    Visual Acuity:    No exam data present    Age-appropriate Screening Schedule:  Refer to the list below for future screening recommendations based on patient's age, sex and/or medical conditions. Orders for these recommended tests are listed in the plan section. The patient has been provided with a written plan.    Health Maintenance   Topic Date Due   • ZOSTER VACCINE (2 of 2) 08/09/2017   • COLONOSCOPY  10/03/2019   • PNEUMOCOCCAL VACCINES (65+ LOW/MEDIUM RISK) (2 of 2 - PPSV23) 02/27/2020 (Originally 5/31/2018)   • TDAP/TD VACCINES (2 - Td) 01/01/2020   • LIPID PANEL  02/12/2020   • MAMMOGRAM   04/20/2020   • INFLUENZA VACCINE  Addressed          The following portions of the patient's history were reviewed and updated as appropriate: allergies, current medications, past family history, past medical history, past social history, past surgical history and problem list.    No outpatient medications prior to visit.     No facility-administered medications prior to visit.        Patient Active Problem List   Diagnosis   • HLD (hyperlipidemia)   • Avitaminosis D   • Rectal pain   • Hemorrhoid   • Heterozygous factor V Leiden mutation (CMS/[facility])       Advanced Care Planning:  Patient does not have an advance directive - information provided to the patient today    Review of Systems   Constitutional: Negative for activity change, appetite change, chills, fatigue and fever.   HENT: Negative for congestion, ear discharge, ear pain, hearing loss, mouth sores and sore throat.    Eyes: Negative for pain, discharge and redness.   Respiratory: Positive for shortness of breath. Negative for cough, chest tightness and wheezing.    Cardiovascular: Negative for chest pain, palpitations and leg swelling.   Gastrointestinal: Negative for abdominal distention, abdominal pain, constipation, diarrhea, nausea and vomiting.   Endocrine: Negative for cold intolerance, polydipsia, polyphagia and polyuria.   Genitourinary: Negative for difficulty urinating, dysuria, flank pain, hematuria and urgency.   Musculoskeletal: Negative for arthralgias, back pain, joint swelling, neck pain and neck stiffness.   Skin: Negative for color change and pallor.   Allergic/Immunologic: Negative for environmental allergies and food allergies.   Neurological: Negative for dizziness, weakness, light-headedness and headaches.   Psychiatric/Behavioral: Negative for behavioral problems, decreased concentration, sleep disturbance and suicidal ideas. The patient is not nervous/anxious.        Compared to one year ago, the patient feels her physical health is  "the same.  Compared to one year ago, the patient feels her mental health is the same.    Reviewed chart for potential of high risk medication in the elderly: yes  Reviewed chart for potential of harmful drug interactions in the elderly:yes    Objective         Vitals:    11/05/19 0858   BP: 110/70   Pulse: 63   Temp: 98 °F (36.7 °C)   SpO2: 98%   Weight: 54.9 kg (121 lb)   Height: 166 cm (65.35\")       Body mass index is 19.92 kg/m².  Discussed the patient's BMI with her. The BMI is in the acceptable range.    Physical Exam   Constitutional: She is oriented to person, place, and time. She appears well-developed and well-nourished.   HENT:   Head: Normocephalic and atraumatic.   Mouth/Throat: Oropharynx is clear and moist.   Eyes: Conjunctivae are normal. Pupils are equal, round, and reactive to light.   Neck: Normal range of motion. Neck supple. No thyromegaly present.   Cardiovascular: Normal rate, regular rhythm, normal heart sounds and intact distal pulses.   Pulmonary/Chest: Effort normal and breath sounds normal. No respiratory distress. She has no wheezes. She has no rales.   Abdominal: Soft. Bowel sounds are normal. She exhibits no distension and no mass. There is no tenderness. No hernia.   Musculoskeletal: She exhibits no edema.   Neurological: She is alert and oriented to person, place, and time. She displays normal reflexes. No cranial nerve deficit or sensory deficit.   Psychiatric: She has a normal mood and affect. Her behavior is normal.         ECG 12 Lead  Date/Time: 11/5/2019 11:52 AM  Performed by: Estella Timmons MD  Authorized by: Estella Timmons MD   Comparison: not compared with previous ECG   Previous ECG: no previous ECG available  Rhythm: sinus bradycardia  Rate: bradycardic  Conduction: conduction normal  Other findings: left ventricular hypertrophy    Clinical impression: abnormal EKG            Assessment/Plan   Medicare Risks and Personalized Health Plan  CMS Preventative Services Quick " Reference  Advance Directive Discussion  Colon Cancer Screening  Immunizations Discussed/Encouraged (specific immunizations; Influenza and Shingrix )  Osteoprorosis Risk    The above risks/problems have been discussed with the patient.  Pertinent information has been shared with the patient in the After Visit Summary.  Follow up plans and orders are seen below in the Assessment/Plan Section.    Diagnoses and all orders for this visit:    1. Welcome to Medicare preventive visit (Primary)  -     Comprehensive Metabolic Panel; Future  -     ECG 12 Lead    2. Mixed hyperlipidemia  -     Lipid Panel; Future    3. Abnormal EKG  -     Adult Transthoracic Echo Complete W/ Cont if Necessary Per Protocol; Future        Patient was seen today first-time for welcome to Medicare visit.  Preventive with screening and healthy lifestyle discussed with patient.  Old record reviewed with patient.  She had colonoscopy done in 2014.  History of polyp.  She will need a colonoscopy again.  Already got the paperwork from her GI.  I recommended her to schedule a follow-up for a screening colonoscopy.  Is up-to-date with her mammogram.  Patient will come for fasting labs.  She was here also here for up on her hyperlipidemia and bone density   Bone   Density results reviewed with patient again.  She had bone density done in 2018.  Advised her to continue vitamin D and calcium.  Also with history of hyperlipidemia, calculated risk for coronary artery disease in 10 years is 5.8.  Does not want to start taking any medication yet.  EKG done in the office today.  Patient does not have a history of hypertension or uncontrolled hypertension.  I will schedule her for echo.            Follow Up:  Return in about 1 year (around 11/5/2020), or if symptoms worsen or fail to improve, for Recheck.     An After Visit Summary and PPPS were given to the patient.

## 2019-11-05 NOTE — PATIENT INSTRUCTIONS
Zoster Vaccine, Recombinant injection  What is this medicine?  ZOSTER VACCINE (ZOS ter vak SEEN) is used to prevent shingles in adults 50 years old and over. This vaccine is not used to treat shingles or nerve pain from shingles.  This medicine may be used for other purposes; ask your health care provider or pharmacist if you have questions.  COMMON BRAND NAME(S): SHINGRIX  What should I tell my health care provider before I take this medicine?  They need to know if you have any of these conditions:  -blood disorders or disease  -cancer like leukemia or lymphoma  -immune system problems or therapy  -an unusual or allergic reaction to vaccines, other medications, foods, dyes, or preservatives  -pregnant or trying to get pregnant  -breast-feeding  How should I use this medicine?  This vaccine is for injection in a muscle. It is given by a health care professional.  Talk to your pediatrician regarding the use of this medicine in children. This medicine is not approved for use in children.  Overdosage: If you think you have taken too much of this medicine contact a poison control center or emergency room at once.  NOTE: This medicine is only for you. Do not share this medicine with others.  What if I miss a dose?  Keep appointments for follow-up (booster) doses as directed. It is important not to miss your dose. Call your doctor or health care professional if you are unable to keep an appointment.  What may interact with this medicine?  -medicines that suppress your immune system  -medicines to treat cancer  -steroid medicines like prednisone or cortisone  This list may not describe all possible interactions. Give your health care provider a list of all the medicines, herbs, non-prescription drugs, or dietary supplements you use. Also tell them if you smoke, drink alcohol, or use illegal drugs. Some items may interact with your medicine.  What should I watch for while using this medicine?  Visit your doctor for regular  check ups.  This vaccine, like all vaccines, may not fully protect everyone.  What side effects may I notice from receiving this medicine?  Side effects that you should report to your doctor or health care professional as soon as possible:  -allergic reactions like skin rash, itching or hives, swelling of the face, lips, or tongue  -breathing problems  Side effects that usually do not require medical attention (report these to your doctor or health care professional if they continue or are bothersome):  -chills  -headache  -fever  -nausea, vomiting  -redness, warmth, pain, swelling or itching at site where injected  -tiredness  This list may not describe all possible side effects. Call your doctor for medical advice about side effects. You may report side effects to FDA at 6-714-ACL-5081.  Where should I keep my medicine?  This vaccine is only given in a clinic, pharmacy, doctor's office, or other health care setting and will not be stored at home.  NOTE: This sheet is a summary. It may not cover all possible information. If you have questions about this medicine, talk to your doctor, pharmacist, or health care provider.  © 2019 [employer]vier/Gold Standard (2018-07-30 13:20:30)

## 2019-11-05 NOTE — PROGRESS NOTES
"The ABCs of the Annual Wellness Visit  [facility]    Chief Complaint   Patient presents with   • Establish Care     [facility] patient of Dr. Sandoval       Subjective   History of Present Illness:  Lori Tony is a 69 y.o. female who presents for a  Welcome to Medicare Visit.    HEALTH RISK ASSESSMENT    Recent Hospitalizations:  {Hospitalization history:3614676328::\"No hospitalization(s) within the last year.\"}    Current Medical Providers:  Patient Care Team:  Estella Timmons MD as PCP - General (Family Medicine)    Smoking Status:  Social History     Tobacco Use   Smoking Status Never Smoker   Smokeless Tobacco Never Used       Alcohol Consumption:  Social History     Substance and Sexual Activity   Alcohol Use Yes    Comment: occasionally       Depression Screen:   PHQ-2/PHQ-9 Depression Screening 11/5/2019   Little interest or pleasure in doing things 0   Feeling down, depressed, or hopeless 0   Total Score 0       Fall Risk Screen:  HOMA Fall Risk Assessment was completed, and patient is at LOW risk for falls.Assessment completed on:11/5/2019    Health Habits and Functional and Cognitive Screening:  No flowsheet data found.      Does the patient have evidence of cognitive impairment? {Yes/No w/ pre-defaulted No:46661::\"No\"}    Asprin use counseling:{Aspirin :58597}    Visual Acuity:    No exam data present    Age-appropriate Screening Schedule:  Refer to the list below for future screening recommendations based on patient's age, sex and/or medical conditions. Orders for these recommended tests are listed in the plan section. The patient has been provided with a written plan.    Health Maintenance   Topic Date Due   • ZOSTER VACCINE (2 of 2) 08/09/2017   • COLONOSCOPY  10/03/2019   • PNEUMOCOCCAL VACCINES (65+ LOW/MEDIUM RISK) (2 of 2 - PPSV23) 02/27/2020 (Originally 5/31/2018)   • TDAP/TD VACCINES (2 - Td) 01/01/2020   • LIPID PANEL  02/12/2020   • MAMMOGRAM  04/20/2020   • INFLUENZA VACCINE  " "Addressed          The following portions of the patient's history were reviewed and updated as appropriate: {history reviewed:20406::\"allergies\",\"current medications\",\"past family history\",\"past medical history\",\"past social history\",\"past surgical history\",\"problem list\"}.    No outpatient medications prior to visit.     No facility-administered medications prior to visit.        Patient Active Problem List   Diagnosis   • HLD (hyperlipidemia)   • Avitaminosis D   • Rectal pain   • Hemorrhoid   • Heterozygous factor V Leiden mutation (CMS/[facility])       Advanced Care Planning:  {Advanced Directive Status:47933}    Review of Systems    Compared to one year ago, the patient feels her physical health is {better worse same:49183}.  Compared to one year ago, the patient feels her mental health is {better worse same:27148}.    Reviewed chart for potential of high risk medication in the elderly: {Response;Yes/No/NA:[id number]::\"yes\"}  Reviewed chart for potential of harmful drug interactions in the elderly:{Response;Yes/No/NA:8792479621::\"yes\"}    Objective         Vitals:    11/05/19 0858   BP: 110/70   Pulse: 63   Temp: 98 °F (36.7 °C)   SpO2: 98%   Weight: 54.9 kg (121 lb)   Height: 166 cm (65.35\")       Body mass index is 19.92 kg/m².  Discussed the patient's BMI with her. The BMI {[facility]12044}.    Physical Exam          Procedures    Assessment/Plan   Medicare Risks and Personalized Health Plan  CMS Preventative Services Quick Reference  {Medicare Wellness Risk Factors and Personalized Health Plan:43708}    The above risks/problems have been discussed with the patient.  Pertinent information has been shared with the patient in the After Visit Summary.  Follow up plans and orders are seen below in the Assessment/Plan Section.    Diagnoses and all orders for this visit:    1. Medicare annual wellness visit, subsequent (Primary)  -     Comprehensive Metabolic Panel; Future  -     ECG 12 Lead    2. " Mixed hyperlipidemia  -     Lipid Panel; Future    3. Abnormal EKG  -     Adult Transthoracic Echo Complete W/ Cont if Necessary Per Protocol; Future        Follow Up:  No Follow-up on file.     An After Visit Summary and PPPS were given to the patient.

## 2019-11-05 NOTE — PROGRESS NOTES
"Kimberly Tony is a 69 y.o. female.     Chief Complaint   Patient presents with   • Establish Care     [facility] patient of Dr. Sandoval       History of Present Illness     No past medical history on file.    Past Surgical History:   Procedure Laterality Date   • HYSTERECTOMY         Family History   Problem Relation Age of Onset   • Dementia Mother    • Hypothyroidism Mother    • Heart disease Mother         pacemaker   • Deep vein thrombosis Father    • Dementia Father    • Glaucoma Father    • Macular degeneration Father    • Factor V Leiden deficiency Sister    • Deep vein thrombosis Sister         unprovoked   • Heart disease Maternal Grandmother         MI at 73   • Factor V Leiden deficiency Sister    • Glaucoma Sister        Social History     Socioeconomic History   • Marital status:      Spouse name: Not on file   • Number of children: 1   • Years of education: Not on file   • Highest education level: Not on file   Tobacco Use   • Smoking status: Never Smoker   • Smokeless tobacco: Never Used   Substance and Sexual Activity   • Alcohol use: Yes     Comment: occasionally   • Drug use: No       The following portions of the patient's history were reviewed and updated as appropriate: allergies, current medications, past family history, past medical history, past social history, past surgical history and problem list.    Review of Systems    Objective   Vitals:    11/05/19 0858   BP: 110/70   Pulse: 63   Temp: 98 °F (36.7 °C)   SpO2: 98%   Weight: 54.9 kg (121 lb)   Height: 166 cm (65.35\")     Body mass index is 19.92 kg/m².  Physical Exam  The 10-year ASCVD risk score (Roger HUTSON Jr., et al., 2013) is: 5.8%    Values used to calculate the score:      Age: 69 years      Sex: Female      Is Non- : No      Diabetic: No      Tobacco smoker: No      Systolic Blood Pressure: 110 mmHg      Is BP treated: No      HDL Cholesterol: 63 mg/dL      Total Cholesterol: 224 " mg/dL  Assessment/Plan   Problem List Items Addressed This Visit     None          No Follow-up on file.

## 2019-11-06 ENCOUNTER — RESULTS ENCOUNTER (OUTPATIENT)
Dept: FAMILY MEDICINE CLINIC | Facility: CLINIC | Age: 69
End: 2019-11-06

## 2019-11-06 DIAGNOSIS — E78.2 MIXED HYPERLIPIDEMIA: ICD-10-CM

## 2019-11-06 DIAGNOSIS — Z00.00 WELCOME TO MEDICARE PREVENTIVE VISIT: ICD-10-CM

## 2019-11-13 LAB
ALBUMIN SERPL-MCNC: 4.5 G/DL (ref 3.5–5.2)
ALBUMIN/GLOB SERPL: 1.7 G/DL
ALP SERPL-CCNC: 76 U/L (ref 39–117)
ALT SERPL-CCNC: 17 U/L (ref 1–33)
AST SERPL-CCNC: 22 U/L (ref 1–32)
BILIRUB SERPL-MCNC: 0.5 MG/DL (ref 0.2–1.2)
BUN SERPL-MCNC: 14 MG/DL (ref 8–23)
BUN/CREAT SERPL: 17.3 (ref 7–25)
CALCIUM SERPL-MCNC: 9.4 MG/DL (ref 8.6–10.5)
CHLORIDE SERPL-SCNC: 102 MMOL/L (ref 98–107)
CHOLEST SERPL-MCNC: 230 MG/DL (ref 0–200)
CO2 SERPL-SCNC: 28.9 MMOL/L (ref 22–29)
CREAT SERPL-MCNC: 0.81 MG/DL (ref 0.57–1)
GLOBULIN SER CALC-MCNC: 2.7 GM/DL
GLUCOSE SERPL-MCNC: 83 MG/DL (ref 65–99)
HDLC SERPL-MCNC: 68 MG/DL (ref 40–60)
LDLC SERPL CALC-MCNC: 146 MG/DL (ref 0–100)
POTASSIUM SERPL-SCNC: 4.3 MMOL/L (ref 3.5–5.2)
PROT SERPL-MCNC: 7.2 G/DL (ref 6–8.5)
SODIUM SERPL-SCNC: 141 MMOL/L (ref 136–145)
TRIGL SERPL-MCNC: 79 MG/DL (ref 0–150)
VLDLC SERPL CALC-MCNC: 15.8 MG/DL

## 2019-11-20 ENCOUNTER — TELEPHONE (OUTPATIENT)
Dept: FAMILY MEDICINE CLINIC | Facility: CLINIC | Age: 69
End: 2019-11-20

## 2019-11-20 ENCOUNTER — HOSPITAL ENCOUNTER (OUTPATIENT)
Dept: CARDIOLOGY | Facility: HOSPITAL | Age: 69
Discharge: HOME | End: 2019-11-20
Admitting: FAMILY MEDICINE

## 2019-11-20 VITALS
OXYGEN SATURATION: 99 % | HEART RATE: 58 BPM | WEIGHT: 120 LBS | DIASTOLIC BLOOD PRESSURE: 52 MMHG | BODY MASS INDEX: 19.99 KG/M2 | HEIGHT: 65 IN | SYSTOLIC BLOOD PRESSURE: 100 MMHG

## 2019-11-20 DIAGNOSIS — R94.31 ABNORMAL EKG: ICD-10-CM

## 2019-11-20 LAB
AORTIC ARCH: 2.3 CM
AORTIC ROOT ANNULUS: 1.6 CM
BH CV ECHO MEAS - ACS: 1.9 CM
BH CV ECHO MEAS - AO MAX PG (FULL): 0.96 MMHG
BH CV ECHO MEAS - AO MAX PG: 6.5 MMHG
BH CV ECHO MEAS - AO MEAN PG (FULL): 1.1 MMHG
BH CV ECHO MEAS - AO MEAN PG: 3.9 MMHG
BH CV ECHO MEAS - AO ROOT AREA (BSA CORRECTED): 1.8
BH CV ECHO MEAS - AO ROOT AREA: 6.6 CM^2
BH CV ECHO MEAS - AO ROOT DIAM: 2.9 CM
BH CV ECHO MEAS - AO V2 MAX: 127.9 CM/SEC
BH CV ECHO MEAS - AO V2 MEAN: 94.6 CM/SEC
BH CV ECHO MEAS - AO V2 VTI: 29.5 CM
BH CV ECHO MEAS - AVA(I,A): 2 CM^2
BH CV ECHO MEAS - AVA(I,D): 2 CM^2
BH CV ECHO MEAS - AVA(V,A): 2.2 CM^2
BH CV ECHO MEAS - AVA(V,D): 2.2 CM^2
BH CV ECHO MEAS - BSA(HAYCOCK): 1.6 M^2
BH CV ECHO MEAS - BSA: 1.6 M^2
BH CV ECHO MEAS - BZI_BMI: 20 KILOGRAMS/M^2
BH CV ECHO MEAS - BZI_METRIC_HEIGHT: 165.1 CM
BH CV ECHO MEAS - BZI_METRIC_WEIGHT: 54.4 KG
BH CV ECHO MEAS - EDV(CUBED): 63.4 ML
BH CV ECHO MEAS - EDV(MOD-SP2): 71 ML
BH CV ECHO MEAS - EDV(MOD-SP4): 54 ML
BH CV ECHO MEAS - EDV(TEICH): 69.5 ML
BH CV ECHO MEAS - EF(CUBED): 74.7 %
BH CV ECHO MEAS - EF(MOD-BP): 61 %
BH CV ECHO MEAS - EF(MOD-SP2): 57.7 %
BH CV ECHO MEAS - EF(MOD-SP4): 61.1 %
BH CV ECHO MEAS - EF(TEICH): 67.1 %
BH CV ECHO MEAS - ESV(CUBED): 16 ML
BH CV ECHO MEAS - ESV(MOD-SP2): 30 ML
BH CV ECHO MEAS - ESV(MOD-SP4): 21 ML
BH CV ECHO MEAS - ESV(TEICH): 22.8 ML
BH CV ECHO MEAS - IVS/LVPW: 1
BH CV ECHO MEAS - IVSD: 1.1 CM
BH CV ECHO MEAS - LAT PEAK E' VEL: 10 CM/SEC
BH CV ECHO MEAS - LV DIASTOLIC VOL/BSA (35-75): 33.9 ML/M^2
BH CV ECHO MEAS - LV MASS(C)D: 148.5 GRAMS
BH CV ECHO MEAS - LV MASS(C)DI: 93.3 GRAMS/M^2
BH CV ECHO MEAS - LV MAX PG: 5.6 MMHG
BH CV ECHO MEAS - LV MEAN PG: 2.8 MMHG
BH CV ECHO MEAS - LV SYSTOLIC VOL/BSA (12-30): 13.2 ML/M^2
BH CV ECHO MEAS - LV V1 MAX: 118.1 CM/SEC
BH CV ECHO MEAS - LV V1 MEAN: 78.7 CM/SEC
BH CV ECHO MEAS - LV V1 VTI: 25.1 CM
BH CV ECHO MEAS - LVIDD: 4 CM
BH CV ECHO MEAS - LVIDS: 2.5 CM
BH CV ECHO MEAS - LVLD AP2: 6.9 CM
BH CV ECHO MEAS - LVLD AP4: 6.7 CM
BH CV ECHO MEAS - LVLS AP2: 6.2 CM
BH CV ECHO MEAS - LVLS AP4: 6.2 CM
BH CV ECHO MEAS - LVOT AREA (M): 2.3 CM^2
BH CV ECHO MEAS - LVOT AREA: 2.4 CM^2
BH CV ECHO MEAS - LVOT DIAM: 1.7 CM
BH CV ECHO MEAS - LVPWD: 1.1 CM
BH CV ECHO MEAS - MED PEAK E' VEL: 7 CM/SEC
BH CV ECHO MEAS - MR MAX PG: 35 MMHG
BH CV ECHO MEAS - MR MAX VEL: 295.9 CM/SEC
BH CV ECHO MEAS - MV A DUR: 0.12 SEC
BH CV ECHO MEAS - MV A MAX VEL: 54.7 CM/SEC
BH CV ECHO MEAS - MV DEC SLOPE: 472.2 CM/SEC^2
BH CV ECHO MEAS - MV DEC TIME: 0.14 SEC
BH CV ECHO MEAS - MV E MAX VEL: 56.3 CM/SEC
BH CV ECHO MEAS - MV E/A: 1
BH CV ECHO MEAS - MV MAX PG: 1.7 MMHG
BH CV ECHO MEAS - MV MEAN PG: 0.59 MMHG
BH CV ECHO MEAS - MV P1/2T MAX VEL: 61.8 CM/SEC
BH CV ECHO MEAS - MV P1/2T: 38.3 MSEC
BH CV ECHO MEAS - MV V2 MAX: 65.2 CM/SEC
BH CV ECHO MEAS - MV V2 MEAN: 35.3 CM/SEC
BH CV ECHO MEAS - MV V2 VTI: 19.8 CM
BH CV ECHO MEAS - MVA P1/2T LCG: 3.6 CM^2
BH CV ECHO MEAS - MVA(P1/2T): 5.7 CM^2
BH CV ECHO MEAS - MVA(VTI): 3 CM^2
BH CV ECHO MEAS - PA ACC TIME: 0.15 SEC
BH CV ECHO MEAS - PA MAX PG (FULL): 1.1 MMHG
BH CV ECHO MEAS - PA MAX PG: 5 MMHG
BH CV ECHO MEAS - PA PR(ACCEL): 12.5 MMHG
BH CV ECHO MEAS - PA V2 MAX: 111.3 CM/SEC
BH CV ECHO MEAS - PULM A REVS DUR: 0.1 SEC
BH CV ECHO MEAS - PULM A REVS VEL: 29.5 CM/SEC
BH CV ECHO MEAS - PULM DIAS VEL: 44.3 CM/SEC
BH CV ECHO MEAS - PULM S/D: 1
BH CV ECHO MEAS - PULM SYS VEL: 46.5 CM/SEC
BH CV ECHO MEAS - PVA(V,A): 1.8 CM^2
BH CV ECHO MEAS - PVA(V,D): 1.8 CM^2
BH CV ECHO MEAS - QP/QS: 0.77
BH CV ECHO MEAS - RAP SYSTOLE: 3 MMHG
BH CV ECHO MEAS - RV MAX PG: 3.8 MMHG
BH CV ECHO MEAS - RV MEAN PG: 2.2 MMHG
BH CV ECHO MEAS - RV V1 MAX: 97.9 CM/SEC
BH CV ECHO MEAS - RV V1 MEAN: 68.2 CM/SEC
BH CV ECHO MEAS - RV V1 VTI: 22.3 CM
BH CV ECHO MEAS - RVOT AREA: 2.1 CM^2
BH CV ECHO MEAS - RVOT DIAM: 1.6 CM
BH CV ECHO MEAS - RVSP: 17 MMHG
BH CV ECHO MEAS - SI(AO): 121.3 ML/M^2
BH CV ECHO MEAS - SI(CUBED): 29.7 ML/M^2
BH CV ECHO MEAS - SI(LVOT): 37.8 ML/M^2
BH CV ECHO MEAS - SI(MOD-SP2): 25.8 ML/M^2
BH CV ECHO MEAS - SI(MOD-SP4): 20.7 ML/M^2
BH CV ECHO MEAS - SI(TEICH): 29.3 ML/M^2
BH CV ECHO MEAS - SUP REN AO DIAM: 1.9 CM
BH CV ECHO MEAS - SV(AO): 193.1 ML
BH CV ECHO MEAS - SV(CUBED): 47.3 ML
BH CV ECHO MEAS - SV(LVOT): 60.1 ML
BH CV ECHO MEAS - SV(MOD-SP2): 41 ML
BH CV ECHO MEAS - SV(MOD-SP4): 33 ML
BH CV ECHO MEAS - SV(RVOT): 46 ML
BH CV ECHO MEAS - SV(TEICH): 46.6 ML
BH CV ECHO MEAS - TAPSE (>1.6): 1.9 CM2
BH CV ECHO MEAS - TR MAX VEL: 186 CM/SEC
BH CV ECHO MEASUREMENTS AVERAGE E/E' RATIO: 6.62
BH CV XLRA - RV BASE: 3.1 CM
BH CV XLRA - RV LENGTH: 6.7 CM
BH CV XLRA - RV MID: 1.7 CM
BH CV XLRA - TDI S': 12 CM/SEC
LEFT ATRIUM VOLUME INDEX: 19 ML/M2
SINUS: 2.6 CM

## 2019-11-20 PROCEDURE — 93306 TTE W/DOPPLER COMPLETE: CPT | Performed by: INTERNAL MEDICINE

## 2019-11-20 PROCEDURE — 93306 TTE W/DOPPLER COMPLETE: CPT

## 2019-11-20 NOTE — TELEPHONE ENCOUNTER
Per echos Interpretation:    · Left ventricular systolic function is normal. Calculated EF = 61%. Normal regional wall motion and cavity size.  · Left ventricular diastolic function is normal. Normal left atrial pressure. There is no evidence of a left ventricular thrombus present.  · Normal right ventricular cavity size, wall thickness, systolic function and septal motion noted.  · No significant valvular stenosis or regurgitation noted.  · Trace tricuspid valve regurgitation is present. Estimated right ventricular systolic pressure from tricuspid regurgitation is normal (<35 mmHg).      Please advise of your thoughts.

## 2019-11-21 ENCOUNTER — TELEPHONE (OUTPATIENT)
Dept: FAMILY MEDICINE CLINIC | Facility: CLINIC | Age: 69
End: 2019-11-21

## 2020-01-06 ENCOUNTER — OUTSIDE FACILITY SERVICE (OUTPATIENT)
Dept: SURGERY | Facility: CLINIC | Age: 70
End: 2020-01-06

## 2020-01-06 PROCEDURE — G0105 COLORECTAL SCRN; HI RISK IND: HCPCS | Performed by: SURGERY

## 2020-09-28 ENCOUNTER — TELEPHONE (OUTPATIENT)
Dept: FAMILY MEDICINE CLINIC | Facility: CLINIC | Age: 70
End: 2020-09-28

## 2020-09-28 NOTE — TELEPHONE ENCOUNTER
----- Message from Alejandra Reese MA sent at 9/28/2020  9:25 AM EDT -----  Regarding: FW: Non-Urgent Medical Question  Contact: [phone]  Can you help me out with this ma'am?    ----- Message -----  From: Lori Tony  Sent: 9/28/2020   9:23 AM EDT  To: [facility]  Subject: Non-Urgent Medical Question                      ACO - So I am being forced to join an ACO because my doctor is Iain Soria?  Can I switch doctors to avoid the ACO?  It looks like I can't talk to [facility] about my care or data, since you have only given me a Medicare number.  When I have called Medicare in the past, you are put on a very long wait list and they never know what you are talking about.      I am not happy about being put into plans without approval by me first.    Lori Tony  [phone]

## 2020-10-09 ENCOUNTER — TELEPHONE (OUTPATIENT)
Dept: FAMILY MEDICINE CLINIC | Facility: CLINIC | Age: 70
End: 2020-10-09

## 2020-10-09 NOTE — TELEPHONE ENCOUNTER
PATIENT MOVED MW TO FEB AND WOULD LIKE TO SCHEDULE BONE DENSITY TEST ABOUT A WEEK BEFORE SO RESULTS WOULD BE BACK AND CAN DISCUSS AT [facility]    ALSO WANTED TO KNOW IF YOU WOULD LIKE LABS PRIOR OR DAY OF [facility] IN FEB    PLEASE ADVISE  [phone]

## 2020-10-12 DIAGNOSIS — E78.2 MIXED HYPERLIPIDEMIA: Primary | ICD-10-CM

## 2020-10-12 DIAGNOSIS — Z00.00 ROUTINE GENERAL MEDICAL EXAMINATION AT A HEALTH CARE FACILITY: ICD-10-CM

## 2020-10-12 DIAGNOSIS — R53.83 FATIGUE, UNSPECIFIED TYPE: ICD-10-CM

## 2020-10-12 NOTE — TELEPHONE ENCOUNTER
Pt informed of Dexa ordered placed. Labs placed also. Informed pt that scheduling will call her.

## 2020-11-20 ENCOUNTER — TELEPHONE (OUTPATIENT)
Dept: FAMILY MEDICINE CLINIC | Facility: CLINIC | Age: 70
End: 2020-11-20

## 2020-11-20 NOTE — TELEPHONE ENCOUNTER
PT CALLED REQUESTING AN ORDER PLACED FOR A COVID TEST. PT IS IN [place] AND IS LOOKING FOR A TEST FOR SOMEWHERE IN [facility]. A [employer] IN [facility], PT HAS BEEN EXPOSED TO SOMEONE WITH COVID.      PLEASE ADVISE     PT CALL BACK   [phone]

## 2020-11-20 NOTE — TELEPHONE ENCOUNTER
Called pt and informed her since she is in [facility] that we cannot order the test for her. Informed her she can go to an UC there. PT was upset with this and hung up.

## 2020-12-04 ENCOUNTER — TELEPHONE (OUTPATIENT)
Dept: FAMILY MEDICINE CLINIC | Facility: CLINIC | Age: 70
End: 2020-12-04

## 2020-12-04 DIAGNOSIS — Z78.0 POST-MENOPAUSAL: Primary | ICD-10-CM

## 2020-12-04 NOTE — TELEPHONE ENCOUNTER
PT IS REQUESTING TO GET THE DEXA SCAN THAT WAS SUGGESTED BY DR NOLAN BEFORE HER WELLNESS VISIT. SHE WANTS HER LABS DONE AT THE SAME TIME.

## 2021-01-19 ENCOUNTER — LAB (OUTPATIENT)
Dept: LAB | Facility: HOSPITAL | Age: 71
End: 2021-01-19

## 2021-01-19 ENCOUNTER — HOSPITAL ENCOUNTER (OUTPATIENT)
Dept: BONE DENSITY | Facility: HOSPITAL | Age: 71
Discharge: HOME | End: 2021-01-19

## 2021-01-19 DIAGNOSIS — Z78.0 POST-MENOPAUSAL: ICD-10-CM

## 2021-01-19 LAB
ALBUMIN SERPL-MCNC: 4.3 G/DL (ref 3.5–5.2)
ALBUMIN/GLOB SERPL: 1.8 G/DL
ALP SERPL-CCNC: 62 U/L (ref 39–117)
ALT SERPL W P-5'-P-CCNC: 18 U/L (ref 1–33)
ANION GAP SERPL CALCULATED.3IONS-SCNC: 8.3 MMOL/L (ref 5–15)
AST SERPL-CCNC: 26 U/L (ref 1–32)
BACTERIA UR QL AUTO: ABNORMAL /HPF
BASOPHILS # BLD AUTO: 0.04 10*3/MM3 (ref 0–0.2)
BASOPHILS NFR BLD AUTO: 0.7 % (ref 0–1.5)
BILIRUB SERPL-MCNC: 0.5 MG/DL (ref 0–1.2)
BILIRUB UR QL STRIP: NEGATIVE
BUN SERPL-MCNC: 21 MG/DL (ref 8–23)
BUN/CREAT SERPL: 27.6 (ref 7–25)
CALCIUM SPEC-SCNC: 9.2 MG/DL (ref 8.6–10.5)
CHLORIDE SERPL-SCNC: 104 MMOL/L (ref 98–107)
CHOLEST SERPL-MCNC: 225 MG/DL (ref 0–200)
CLARITY UR: CLEAR
CO2 SERPL-SCNC: 27.7 MMOL/L (ref 22–29)
COLOR UR: YELLOW
CREAT SERPL-MCNC: 0.76 MG/DL (ref 0.57–1)
DEPRECATED RDW RBC AUTO: 43.1 FL (ref 37–54)
EOSINOPHIL # BLD AUTO: 0.09 10*3/MM3 (ref 0–0.4)
EOSINOPHIL NFR BLD AUTO: 1.6 % (ref 0.3–6.2)
ERYTHROCYTE [DISTWIDTH] IN BLOOD BY AUTOMATED COUNT: 12.8 % (ref 12.3–15.4)
GFR SERPL CREATININE-BSD FRML MDRD: 75 ML/MIN/1.73
GLOBULIN UR ELPH-MCNC: 2.4 GM/DL
GLUCOSE SERPL-MCNC: 84 MG/DL (ref 65–99)
GLUCOSE UR STRIP-MCNC: NEGATIVE MG/DL
HCT VFR BLD AUTO: 40.2 % (ref 34–46.6)
HDLC SERPL-MCNC: 64 MG/DL (ref 40–60)
HGB BLD-MCNC: 13.4 G/DL (ref 12–15.9)
HGB UR QL STRIP.AUTO: NEGATIVE
HYALINE CASTS UR QL AUTO: ABNORMAL /LPF
IMM GRANULOCYTES # BLD AUTO: 0.02 10*3/MM3 (ref 0–0.05)
IMM GRANULOCYTES NFR BLD AUTO: 0.4 % (ref 0–0.5)
KETONES UR QL STRIP: NEGATIVE
LDLC SERPL CALC-MCNC: 150 MG/DL (ref 0–100)
LDLC/HDLC SERPL: 2.31 {RATIO}
LEUKOCYTE ESTERASE UR QL STRIP.AUTO: ABNORMAL
LYMPHOCYTES # BLD AUTO: 2.12 10*3/MM3 (ref 0.7–3.1)
LYMPHOCYTES NFR BLD AUTO: 38.1 % (ref 19.6–45.3)
MCH RBC QN AUTO: 31.3 PG (ref 26.6–33)
MCHC RBC AUTO-ENTMCNC: 33.3 G/DL (ref 31.5–35.7)
MCV RBC AUTO: 93.9 FL (ref 79–97)
MONOCYTES # BLD AUTO: 0.56 10*3/MM3 (ref 0.1–0.9)
MONOCYTES NFR BLD AUTO: 10.1 % (ref 5–12)
NEUTROPHILS NFR BLD AUTO: 2.74 10*3/MM3 (ref 1.7–7)
NEUTROPHILS NFR BLD AUTO: 49.1 % (ref 42.7–76)
NITRITE UR QL STRIP: NEGATIVE
NRBC BLD AUTO-RTO: 0 /100 WBC (ref 0–0.2)
PH UR STRIP.AUTO: 6.5 [PH] (ref 5–8)
PLATELET # BLD AUTO: 272 10*3/MM3 (ref 140–450)
PMV BLD AUTO: 11 FL (ref 6–12)
POTASSIUM SERPL-SCNC: 4.7 MMOL/L (ref 3.5–5.2)
PROT SERPL-MCNC: 6.7 G/DL (ref 6–8.5)
PROT UR QL STRIP: NEGATIVE
RBC # BLD AUTO: 4.28 10*6/MM3 (ref 3.77–5.28)
RBC # UR: ABNORMAL /HPF
REF LAB TEST METHOD: ABNORMAL
SODIUM SERPL-SCNC: 140 MMOL/L (ref 136–145)
SP GR UR STRIP: 1.02 (ref 1–1.03)
SQUAMOUS #/AREA URNS HPF: ABNORMAL /HPF
TRIGL SERPL-MCNC: 66 MG/DL (ref 0–150)
TSH SERPL DL<=0.05 MIU/L-ACNC: 2.64 UIU/ML (ref 0.27–4.2)
UROBILINOGEN UR QL STRIP: ABNORMAL
VLDLC SERPL-MCNC: 11 MG/DL (ref 5–40)
WBC # BLD AUTO: 5.57 10*3/MM3 (ref 3.4–10.8)
WBC UR QL AUTO: ABNORMAL /HPF

## 2021-01-19 PROCEDURE — 36415 COLL VENOUS BLD VENIPUNCTURE: CPT | Performed by: FAMILY MEDICINE

## 2021-01-19 PROCEDURE — 81001 URINALYSIS AUTO W/SCOPE: CPT | Performed by: FAMILY MEDICINE

## 2021-01-19 PROCEDURE — 87086 URINE CULTURE/COLONY COUNT: CPT | Performed by: FAMILY MEDICINE

## 2021-01-19 PROCEDURE — 84443 ASSAY THYROID STIM HORMONE: CPT | Performed by: FAMILY MEDICINE

## 2021-01-19 PROCEDURE — 77080 DXA BONE DENSITY AXIAL: CPT

## 2021-01-19 PROCEDURE — 85025 COMPLETE CBC W/AUTO DIFF WBC: CPT | Performed by: FAMILY MEDICINE

## 2021-01-19 PROCEDURE — 80061 LIPID PANEL: CPT | Performed by: FAMILY MEDICINE

## 2021-01-19 PROCEDURE — 80053 COMPREHEN METABOLIC PANEL: CPT | Performed by: FAMILY MEDICINE

## 2021-01-20 LAB — BACTERIA SPEC AEROBE CULT: NO GROWTH

## 2021-02-01 ENCOUNTER — OFFICE VISIT (OUTPATIENT)
Dept: FAMILY MEDICINE CLINIC | Facility: CLINIC | Age: 71
End: 2021-02-01

## 2021-02-01 VITALS
WEIGHT: 119.3 LBS | HEART RATE: 83 BPM | DIASTOLIC BLOOD PRESSURE: 74 MMHG | BODY MASS INDEX: 19.88 KG/M2 | SYSTOLIC BLOOD PRESSURE: 100 MMHG | OXYGEN SATURATION: 99 % | TEMPERATURE: 96.9 F | HEIGHT: 65 IN

## 2021-02-01 DIAGNOSIS — Z00.00 MEDICARE ANNUAL WELLNESS VISIT, SUBSEQUENT: Primary | ICD-10-CM

## 2021-02-01 DIAGNOSIS — E78.2 MIXED HYPERLIPIDEMIA: ICD-10-CM

## 2021-02-01 DIAGNOSIS — Z12.31 BREAST CANCER SCREENING BY MAMMOGRAM: ICD-10-CM

## 2021-02-01 PROBLEM — K62.89 RECTAL PAIN: Status: RESOLVED | Noted: 2017-05-31 | Resolved: 2021-02-01

## 2021-02-01 PROBLEM — K64.9 HEMORRHOID: Status: RESOLVED | Noted: 2017-05-31 | Resolved: 2021-02-01

## 2021-02-01 PROCEDURE — G0439 PPPS, SUBSEQ VISIT: HCPCS | Performed by: FAMILY MEDICINE

## 2021-02-01 RX ORDER — MELATONIN
1000 DAILY
COMMUNITY

## 2021-02-01 NOTE — PROGRESS NOTES
The ABCs of the Annual Wellness Visit  Subsequent Medicare Wellness Visit    Chief Complaint   Patient presents with   • Medicare Wellness-subsequent     AWV       Subjective   History of Present Illness:  Lori Tony is a 70 y.o. female who presents for a Subsequent Medicare Wellness Visit.    HEALTH RISK ASSESSMENT    Recent Hospitalizations:  No hospitalization(s) within the last year.    Current Medical Providers:  Patient Care Team:  Estella Timmons MD as PCP - General (Family Medicine)    Smoking Status:  Social History     Tobacco Use   Smoking Status Never Smoker   Smokeless Tobacco Never Used       Alcohol Consumption:  Social History     Substance and Sexual Activity   Alcohol Use Yes   • Alcohol/week: 2.0 standard drinks   • Types: 2 Glasses of wine per week    Comment: Maybe       Depression Screen:   PHQ-2/PHQ-9 Depression Screening 11/5/2019   Little interest or pleasure in doing things 0   Feeling down, depressed, or hopeless 0   Total Score 0       Fall Risk Screen:  HOMA Fall Risk Assessment was completed, and patient is at LOW risk for falls.Assessment completed on:2/1/2021    Health Habits and Functional and Cognitive Screening:  Functional & Cognitive Status 2/1/2021   Do you have difficulty preparing food and eating? No   Do you have difficulty bathing yourself, getting dressed or grooming yourself? No   Do you have difficulty using the toilet? No   Do you have difficulty moving around from place to place? No   Do you have trouble with steps or getting out of a bed or a chair? No   Current Diet Well Balanced Diet   Dental Exam Up to date   Eye Exam Up to date   Exercise (times per week) 5 times per week   Current Exercise Activities Include Swimming   Do you need help using the phone?  No   Are you deaf or do you have serious difficulty hearing?  No   Do you need help with transportation? No   Do you need help shopping? No   Do you need help preparing meals?  No   Do you need help with  housework?  No   Do you need help with laundry? No   Do you need help taking your medications? No   Do you need help managing money? No   Do you ever drive or ride in a car without wearing a seat belt? No   Have you felt unusual stress, anger or loneliness in the last month? No   Who do you live with? Spouse   If you need help, do you have trouble finding someone available to you? No   Have you been bothered in the last four weeks by sexual problems? No   Do you have difficulty concentrating, remembering or making decisions? No         Does the patient have evidence of cognitive impairment? No    Asprin use counseling:Does not need ASA (and currently is not on it)    Age-appropriate Screening Schedule:  Refer to the list below for future screening recommendations based on patient's age, sex and/or medical conditions. Orders for these recommended tests are listed in the plan section. The patient has been provided with a written plan.    Health Maintenance   Topic Date Due   • ZOSTER VACCINE (2 of 2) 08/09/2017   • TDAP/TD VACCINES (2 - Td) 01/01/2020   • MAMMOGRAM  04/20/2020   • LIPID PANEL  01/19/2022   • COLONOSCOPY  01/06/2025   • INFLUENZA VACCINE  Completed   • PAP SMEAR  Discontinued          The following portions of the patient's history were reviewed and updated as appropriate: allergies, current medications, past family history, past medical history, past social history, past surgical history and problem list.    Outpatient Medications Prior to Visit   Medication Sig Dispense Refill   • cholecalciferol (VITAMIN D3) 25 MCG (1000 UT) tablet Take 1,000 Units by mouth Daily.       No facility-administered medications prior to visit.        Patient Active Problem List   Diagnosis   • HLD (hyperlipidemia)   • Avitaminosis D   • Heterozygous factor V Leiden mutation (CMS/[facility])       Advanced Care Planning:  ACP discussion was held with the patient during this visit. Patient does not have an advance directive,  "information provided.    Review of Systems    Compared to one year ago, the patient feels her physical health is the same.  Compared to one year ago, the patient feels her mental health is the same.    Reviewed chart for potential of high risk medication in the elderly: yes  Reviewed chart for potential of harmful drug interactions in the elderly:yes    Objective         Vitals:    02/01/21 0825   BP: 100/74   Pulse: 83   Temp: 96.9 °F (36.1 °C)   SpO2: 99%   Weight: 54.1 kg (119 lb 4.8 oz)   Height: 164.5 cm (64.75\")       Body mass index is 20.01 kg/m².  Discussed the patient's BMI with her. The BMI is in the acceptable range.    Physical Exam  Constitutional:       Appearance: Normal appearance. She is well-developed.   HENT:      Head: Normocephalic and atraumatic.      Right Ear: Tympanic membrane, ear canal and external ear normal.      Left Ear: Tympanic membrane, ear canal and external ear normal.      Nose: Nose normal.      Mouth/Throat:      Mouth: Mucous membranes are moist.   Eyes:      General: No scleral icterus.     Extraocular Movements: Extraocular movements intact.      Conjunctiva/sclera: Conjunctivae normal.      Pupils: Pupils are equal, round, and reactive to light.   Neck:      Musculoskeletal: Normal range of motion and neck supple.      Thyroid: No thyromegaly.   Cardiovascular:      Rate and Rhythm: Normal rate and regular rhythm.      Pulses: Normal pulses.      Heart sounds: Normal heart sounds.   Pulmonary:      Effort: Pulmonary effort is normal. No respiratory distress.      Breath sounds: Normal breath sounds. No wheezing or rales.   Abdominal:      General: Bowel sounds are normal. There is no distension.      Palpations: Abdomen is soft. There is no mass.      Tenderness: There is no abdominal tenderness.      Hernia: No hernia is present.   Musculoskeletal: Normal range of motion.         General: No swelling or tenderness.   Lymphadenopathy:      Cervical: No cervical adenopathy. "   Skin:     General: Skin is warm.   Neurological:      General: No focal deficit present.      Mental Status: She is alert and oriented to person, place, and time.      Cranial Nerves: No cranial nerve deficit.      Sensory: No sensory deficit.      Deep Tendon Reflexes: Reflexes normal.   Psychiatric:         Mood and Affect: Mood normal.         Behavior: Behavior normal.         Thought Content: Thought content normal.         Judgment: Judgment normal.         Lab Results   Component Value Date    TRIG 66 01/19/2021    HDL 64 (H) 01/19/2021     (H) 01/19/2021    VLDL 11 01/19/2021      The 10-year ASCVD risk score (Roger HUTSON Jr., et al., 2013) is: 5.4%    Values used to calculate the score:      Age: 70 years      Sex: Female      Is Non- : No      Diabetic: No      Tobacco smoker: No      Systolic Blood Pressure: 100 mmHg      Is BP treated: No      HDL Cholesterol: 64 mg/dL      Total Cholesterol: 225 mg/dL  Assessment/Plan   Medicare Risks and Personalized Health Plan  CMS Preventative Services Quick Reference  Advance Directive Discussion  Depression/Dysphoria  Immunizations Discussed/Encouraged (specific immunizations; adacel Tdap, Pneumococcal 23 and Shingrix )  Osteoprorosis Risk    The above risks/problems have been discussed with the patient.  Pertinent information has been shared with the patient in the After Visit Summary.  Follow up plans and orders are seen below in the Assessment/Plan Section.      Diagnoses and all orders for this visit:    1. Medicare annual wellness visit, subsequent (Primary)    2. Breast cancer screening by mammogram  -     Mammo Screening Digital Tomosynthesis Bilateral With CAD; Future    3. Mixed hyperlipidemia      Lori Tony is a 70-year-old female patient came here for Medicare wellness visit.  Preventive screening discussed with patient.  Her last mammogram was 2019.  I will schedule her for mammogram.  She recently had bone density done.   Results discussed with patient in detail.  She will continue vitamin D and start taking calcium.  Patient also has a history of hyperlipidemia not on medication.  Her risk for coronary artery disease discussed with patient.  She does not want to start taking a statin.  Her LDL has kept getting worse over the years.  She will start flaxseed fish oil.  I discussed modifiable and nonmodifiable factors with patient.  We also did discuss the vaccination with her.  She refused pneumonia 23.  I gave her information and pneumonia 23 and Tdap to read and make informed decision.  She recently got the COVID-19 vaccine.        Follow Up:  Return in about 1 year (around 2/1/2022) for Annual physical.     An After Visit Summary and PPPS were given to the patient.

## 2021-04-12 ENCOUNTER — HOSPITAL ENCOUNTER (OUTPATIENT)
Dept: MAMMOGRAPHY | Facility: HOSPITAL | Age: 71
Discharge: HOME | End: 2021-04-12
Admitting: FAMILY MEDICINE

## 2021-04-12 DIAGNOSIS — Z12.31 BREAST CANCER SCREENING BY MAMMOGRAM: ICD-10-CM

## 2021-04-12 PROCEDURE — 77063 BREAST TOMOSYNTHESIS BI: CPT

## 2021-04-12 PROCEDURE — 77067 SCR MAMMO BI INCL CAD: CPT

## 2021-04-14 ENCOUNTER — TELEPHONE (OUTPATIENT)
Dept: FAMILY MEDICINE CLINIC | Facility: CLINIC | Age: 71
End: 2021-04-14

## 2022-02-23 ENCOUNTER — PATIENT ROUNDING (BHMG ONLY) (OUTPATIENT)
Dept: INTERNAL MEDICINE | Facility: CLINIC | Age: 72
End: 2022-02-23

## 2022-02-23 ENCOUNTER — OFFICE VISIT (OUTPATIENT)
Dept: INTERNAL MEDICINE | Facility: CLINIC | Age: 72
End: 2022-02-23

## 2022-02-23 VITALS
BODY MASS INDEX: 20.16 KG/M2 | WEIGHT: 121 LBS | TEMPERATURE: 97.3 F | HEART RATE: 70 BPM | HEIGHT: 65 IN | SYSTOLIC BLOOD PRESSURE: 118 MMHG | DIASTOLIC BLOOD PRESSURE: 66 MMHG

## 2022-02-23 DIAGNOSIS — Z00.00 MEDICARE ANNUAL WELLNESS VISIT, SUBSEQUENT: ICD-10-CM

## 2022-02-23 DIAGNOSIS — Z12.31 VISIT FOR SCREENING MAMMOGRAM: ICD-10-CM

## 2022-02-23 DIAGNOSIS — E55.9 AVITAMINOSIS D: Primary | ICD-10-CM

## 2022-02-23 DIAGNOSIS — E78.2 MIXED HYPERLIPIDEMIA: ICD-10-CM

## 2022-02-23 PROCEDURE — 1160F RVW MEDS BY RX/DR IN RCRD: CPT | Performed by: INTERNAL MEDICINE

## 2022-02-23 PROCEDURE — G0439 PPPS, SUBSEQ VISIT: HCPCS | Performed by: INTERNAL MEDICINE

## 2022-02-23 PROCEDURE — 1170F FXNL STATUS ASSESSED: CPT | Performed by: INTERNAL MEDICINE

## 2022-02-23 NOTE — PROGRESS NOTES
February 23, 2022    Hello, may I speak with Lori Tony?    My name is Malka Gutierrez      I am  with [facility]  [address] [place] [id number].    Before we get started may I verify your date of birth? 1950    I am calling to officially welcome you to our practice and ask about your recent visit. Is this a good time to talk? yes    Tell me about your visit with us. What things went well?  Everything       We're always looking for ways to make our patients' experiences even better. Do you have recommendations on ways we may improve?  no    Overall were you satisfied with your first visit to our practice? yes       I appreciate you taking the time to speak with me today. Is there anything else I can do for you? no      Thank you, and have a great day.

## 2022-02-23 NOTE — PROGRESS NOTES
The ABCs of the Annual Wellness Visit  Subsequent Medicare Wellness Visit    Chief Complaint   Patient presents with   • Medicare Wellness-subsequent     New Pt      Subjective    History of Present Illness:  Lori Tony is a 71 y.o. female who presents for a Subsequent Medicare Wellness Visit.  Here to establish- she is very healthy woman- exercises with tennis, swimming and golf regularly.  She follows a good diet.   She is concerned about bone density and Vitamin D.  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     The following portions of the patient's history were reviewed and   updated as appropriate: allergies, current medications, past family history, past medical history, past social history, past surgical history and problem list.    Compared to one year ago, the patient feels her physical   health is the same.    Compared to one year ago, the patient feels her mental   health is the same.    Recent Hospitalizations:  She was not admitted to the hospital during the last year.       Current Medical Providers:  Patient Care Team:  Tabitha French MD as PCP - General (Internal Medicine)  Ana Chiu MD as Surgeon (General Surgery)    Outpatient Medications Prior to Visit   Medication Sig Dispense Refill   • cholecalciferol (VITAMIN D3) 25 MCG (1000 UT) tablet Take 1,000 Units by mouth Daily.       No facility-administered medications prior to visit.       No opioid medication identified on active medication list. I have reviewed chart for other potential  high risk medication/s and harmful drug interactions in the elderly.          Aspirin is not on active medication list.  Aspirin use is not indicated based on review of current medical condition/s. Risk of harm outweighs potential benefits.  .    Patient Active Problem List   Diagnosis   • HLD (hyperlipidemia)   • Avitaminosis D   • Heterozygous factor V Leiden  "mutation (HCC)     Advance Care Planning  Advance Directive is not on file.  ACP discussion was held with the patient during this visit. Patient does not have an advance directive, information provided.    Review of Systems   Constitutional: Negative for activity change.   HENT: Negative for congestion and hearing loss.    Eyes: Negative for visual disturbance.   Respiratory: Negative for cough and shortness of breath.    Cardiovascular: Negative for chest pain and leg swelling.   Gastrointestinal: Negative for abdominal pain.   Genitourinary: Negative for difficulty urinating.   Musculoskeletal: Negative for arthralgias and back pain.   Psychiatric/Behavioral: Negative for dysphoric mood.        Objective    Vitals:    02/23/22 1013   BP: 118/66   Pulse: 70   Temp: 97.3 °F (36.3 °C)   Weight: 54.9 kg (121 lb)   Height: 164.5 cm (64.75\")     BMI Readings from Last 1 Encounters:   02/23/22 20.29 kg/m²   BMI is within normal parameters. No follow-up required.    Does the patient have evidence of cognitive impairment? No    Physical Exam  Constitutional:       Appearance: Normal appearance.   Cardiovascular:      Rate and Rhythm: Normal rate and regular rhythm.   Pulmonary:      Effort: Pulmonary effort is normal.      Breath sounds: Normal breath sounds.   Musculoskeletal:      Right lower leg: No edema.      Left lower leg: No edema.   Lymphadenopathy:      Cervical: No cervical adenopathy.   Neurological:      General: No focal deficit present.   Psychiatric:         Mood and Affect: Mood normal.         Behavior: Behavior normal.         Thought Content: Thought content normal.         Judgment: Judgment normal.                 HEALTH RISK ASSESSMENT    Smoking Status:  Social History     Tobacco Use   Smoking Status Never Smoker   Smokeless Tobacco Never Used     Alcohol Consumption:  Social History     Substance and Sexual Activity   Alcohol Use Yes   • Alcohol/week: 2.0 standard drinks   • Types: 2 Glasses of " wine per week    Comment: Maybe     Fall Risk Screen:    HOMA Fall Risk Assessment was completed, and patient is at LOW risk for falls.Assessment completed on:2/23/2022    Depression Screening:  PHQ-2/PHQ-9 Depression Screening 2/23/2022   Little interest or pleasure in doing things 0   Feeling down, depressed, or hopeless 0   Total Score 0       Health Habits and Functional and Cognitive Screening:  Functional & Cognitive Status 2/23/2022   Do you have difficulty preparing food and eating? No   Do you have difficulty bathing yourself, getting dressed or grooming yourself? No   Do you have difficulty using the toilet? No   Do you have difficulty moving around from place to place? No   Do you have trouble with steps or getting out of a bed or a chair? No   Current Diet Well Balanced Diet   Dental Exam Up to date   Eye Exam Up to date   Exercise (times per week) 4 times per week   Current Exercises Include Tennis   Current Exercise Activities Include -   Do you need help using the phone?  No   Are you deaf or do you have serious difficulty hearing?  No   Do you need help with transportation? No   Do you need help shopping? No   Do you need help preparing meals?  No   Do you need help with housework?  No   Do you need help with laundry? No   Do you need help taking your medications? No   Do you need help managing money? No   Do you ever drive or ride in a car without wearing a seat belt? No   Have you felt unusual stress, anger or loneliness in the last month? No   Who do you live with? Spouse   If you need help, do you have trouble finding someone available to you? No   Have you been bothered in the last four weeks by sexual problems? No   Do you have difficulty concentrating, remembering or making decisions? No       Age-appropriate Screening Schedule:  Refer to the list below for future screening recommendations based on patient's age, sex and/or medical conditions. Orders for these recommended tests are listed in  the plan section. The patient has been provided with a written plan.    Health Maintenance   Topic Date Due   • ZOSTER VACCINE (2 of 2) 08/09/2017   • TDAP/TD VACCINES (2 - Td or Tdap) 01/01/2020   • LIPID PANEL  01/19/2022   • MAMMOGRAM  04/12/2022   • DXA SCAN  01/19/2023   • INFLUENZA VACCINE  Completed   • PAP SMEAR  Discontinued              Assessment/Plan   CMS Preventative Services Quick Reference  Risk Factors Identified During Encounter  Immunizations Discussed/Encouraged (specific Immunizations; Tdap, Pneumococcal 23 and Shingrix  The above risks/problems have been discussed with the patient.  Follow up actions/plans if indicated are seen below in the Assessment/Plan Section.  Pertinent information has been shared with the patient in the After Visit Summary.    Diagnoses and all orders for this visit:    1. Avitaminosis D (Primary)  Comments:  on low dose replacement- recheck level, reviewed dexa   Orders:  -     Vitamin D 25 Hydroxy    2. Visit for screening mammogram  -     Mammo Screening Bilateral With CAD    3. Mixed hyperlipidemia  Comments:  recheck, watches her diet closely.  Orders:  -     Comprehensive Metabolic Panel  -     Lipid Panel With / Chol / HDL Ratio    4. Medicare annual wellness visit, subsequent  Comments:  refuses Pneumovax, TDap and Shingrix at this time- discussed indications.  Continue healthy lifestyle.          Follow Up:   Return in about 1 year (around 2/23/2023) for Medicare Wellness, Lab Today, Lab Before FUP.     An After Visit Summary and PPPS were made available to the patient.

## 2022-02-24 LAB
25(OH)D3+25(OH)D2 SERPL-MCNC: 36.6 NG/ML (ref 30–100)
ALBUMIN SERPL-MCNC: 4.7 G/DL (ref 3.8–4.8)
ALBUMIN/GLOB SERPL: 1.9 {RATIO} (ref 1.2–2.2)
ALP SERPL-CCNC: 72 IU/L (ref 44–121)
ALT SERPL-CCNC: 21 IU/L (ref 0–32)
AST SERPL-CCNC: 24 IU/L (ref 0–40)
BILIRUB SERPL-MCNC: 0.9 MG/DL (ref 0–1.2)
BUN SERPL-MCNC: 18 MG/DL (ref 8–27)
BUN/CREAT SERPL: 19 (ref 12–28)
CALCIUM SERPL-MCNC: 9.7 MG/DL (ref 8.7–10.3)
CHLORIDE SERPL-SCNC: 102 MMOL/L (ref 96–106)
CHOLEST SERPL-MCNC: 267 MG/DL (ref 100–199)
CHOLEST/HDLC SERPL: 4.2 RATIO (ref 0–4.4)
CO2 SERPL-SCNC: 22 MMOL/L (ref 20–29)
CREAT SERPL-MCNC: 0.93 MG/DL (ref 0.57–1)
GLOBULIN SER CALC-MCNC: 2.5 G/DL (ref 1.5–4.5)
GLUCOSE SERPL-MCNC: 95 MG/DL (ref 65–99)
HDLC SERPL-MCNC: 64 MG/DL
LDLC SERPL CALC-MCNC: 185 MG/DL (ref 0–99)
POTASSIUM SERPL-SCNC: 5.1 MMOL/L (ref 3.5–5.2)
PROT SERPL-MCNC: 7.2 G/DL (ref 6–8.5)
SODIUM SERPL-SCNC: 142 MMOL/L (ref 134–144)
TRIGL SERPL-MCNC: 103 MG/DL (ref 0–149)
VLDLC SERPL CALC-MCNC: 18 MG/DL (ref 5–40)

## 2022-02-25 ENCOUNTER — TRANSCRIBE ORDERS (OUTPATIENT)
Dept: ADMINISTRATIVE | Facility: HOSPITAL | Age: 72
End: 2022-02-25

## 2022-02-25 DIAGNOSIS — Z12.31 VISIT FOR SCREENING MAMMOGRAM: Primary | ICD-10-CM

## 2022-04-27 ENCOUNTER — HOSPITAL ENCOUNTER (OUTPATIENT)
Dept: MAMMOGRAPHY | Facility: HOSPITAL | Age: 72
Discharge: HOME | End: 2022-04-27
Admitting: INTERNAL MEDICINE

## 2022-04-27 DIAGNOSIS — Z12.31 VISIT FOR SCREENING MAMMOGRAM: ICD-10-CM

## 2022-04-27 PROCEDURE — 77067 SCR MAMMO BI INCL CAD: CPT

## 2022-04-27 PROCEDURE — 77063 BREAST TOMOSYNTHESIS BI: CPT

## 2022-05-10 DIAGNOSIS — H93.19 TINNITUS, UNSPECIFIED LATERALITY: Primary | ICD-10-CM

## 2023-02-20 DIAGNOSIS — Z00.00 MEDICARE ANNUAL WELLNESS VISIT, SUBSEQUENT: Primary | ICD-10-CM

## 2023-02-20 LAB
ALBUMIN SERPL-MCNC: 4.3 G/DL (ref 3.5–5.2)
ALBUMIN/GLOB SERPL: 1.7 G/DL
ALP SERPL-CCNC: 81 U/L (ref 39–117)
ALT SERPL-CCNC: 10 U/L (ref 1–33)
AST SERPL-CCNC: 15 U/L (ref 1–32)
BASOPHILS # BLD AUTO: 0.03 10*3/MM3 (ref 0–0.2)
BASOPHILS NFR BLD AUTO: 0.4 % (ref 0–1.5)
BILIRUB SERPL-MCNC: 0.6 MG/DL (ref 0–1.2)
BUN SERPL-MCNC: 12 MG/DL (ref 8–23)
BUN/CREAT SERPL: 14.5 (ref 7–25)
CALCIUM SERPL-MCNC: 9.4 MG/DL (ref 8.6–10.5)
CHLORIDE SERPL-SCNC: 100 MMOL/L (ref 98–107)
CHOLEST SERPL-MCNC: 205 MG/DL (ref 0–200)
CO2 SERPL-SCNC: 29.4 MMOL/L (ref 22–29)
CREAT SERPL-MCNC: 0.83 MG/DL (ref 0.57–1)
EGFRCR SERPLBLD CKD-EPI 2021: 75.5 ML/MIN/1.73
EOSINOPHIL # BLD AUTO: 0.06 10*3/MM3 (ref 0–0.4)
EOSINOPHIL NFR BLD AUTO: 0.7 % (ref 0.3–6.2)
ERYTHROCYTE [DISTWIDTH] IN BLOOD BY AUTOMATED COUNT: 12.9 % (ref 12.3–15.4)
GLOBULIN SER CALC-MCNC: 2.5 GM/DL
GLUCOSE SERPL-MCNC: 97 MG/DL (ref 65–99)
HCT VFR BLD AUTO: 39.6 % (ref 34–46.6)
HDLC SERPL-MCNC: 49 MG/DL (ref 40–60)
HGB BLD-MCNC: 13.2 G/DL (ref 12–15.9)
IMM GRANULOCYTES # BLD AUTO: 0.02 10*3/MM3 (ref 0–0.05)
IMM GRANULOCYTES NFR BLD AUTO: 0.2 % (ref 0–0.5)
LDLC SERPL CALC-MCNC: 136 MG/DL (ref 0–100)
LYMPHOCYTES # BLD AUTO: 2.3 10*3/MM3 (ref 0.7–3.1)
LYMPHOCYTES NFR BLD AUTO: 27.3 % (ref 19.6–45.3)
MCH RBC QN AUTO: 31.3 PG (ref 26.6–33)
MCHC RBC AUTO-ENTMCNC: 33.3 G/DL (ref 31.5–35.7)
MCV RBC AUTO: 93.8 FL (ref 79–97)
MONOCYTES # BLD AUTO: 0.74 10*3/MM3 (ref 0.1–0.9)
MONOCYTES NFR BLD AUTO: 8.8 % (ref 5–12)
NEUTROPHILS # BLD AUTO: 5.28 10*3/MM3 (ref 1.7–7)
NEUTROPHILS NFR BLD AUTO: 62.6 % (ref 42.7–76)
NRBC BLD AUTO-RTO: 0 /100 WBC (ref 0–0.2)
PLATELET # BLD AUTO: 231 10*3/MM3 (ref 140–450)
POTASSIUM SERPL-SCNC: 3.8 MMOL/L (ref 3.5–5.2)
PROT SERPL-MCNC: 6.8 G/DL (ref 6–8.5)
RBC # BLD AUTO: 4.22 10*6/MM3 (ref 3.77–5.28)
SODIUM SERPL-SCNC: 138 MMOL/L (ref 136–145)
TRIGL SERPL-MCNC: 112 MG/DL (ref 0–150)
VLDLC SERPL CALC-MCNC: 20 MG/DL (ref 5–40)
WBC # BLD AUTO: 8.43 10*3/MM3 (ref 3.4–10.8)

## 2023-02-27 ENCOUNTER — OFFICE VISIT (OUTPATIENT)
Dept: INTERNAL MEDICINE | Facility: CLINIC | Age: 73
End: 2023-02-27
Payer: MEDICARE

## 2023-02-27 VITALS
DIASTOLIC BLOOD PRESSURE: 74 MMHG | HEART RATE: 72 BPM | HEIGHT: 65 IN | SYSTOLIC BLOOD PRESSURE: 132 MMHG | WEIGHT: 116 LBS | BODY MASS INDEX: 19.33 KG/M2

## 2023-02-27 DIAGNOSIS — Z00.00 MEDICARE ANNUAL WELLNESS VISIT, SUBSEQUENT: ICD-10-CM

## 2023-02-27 DIAGNOSIS — R92.2 DENSE BREAST TISSUE ON MAMMOGRAM: ICD-10-CM

## 2023-02-27 DIAGNOSIS — M85.88 OTHER SPECIFIED DISORDERS OF BONE DENSITY AND STRUCTURE, OTHER SITE: Primary | ICD-10-CM

## 2023-02-27 DIAGNOSIS — Z23 NEED FOR PNEUMOCOCCAL VACCINATION: ICD-10-CM

## 2023-02-27 DIAGNOSIS — Z12.31 VISIT FOR SCREENING MAMMOGRAM: ICD-10-CM

## 2023-02-27 PROCEDURE — 1160F RVW MEDS BY RX/DR IN RCRD: CPT | Performed by: INTERNAL MEDICINE

## 2023-02-27 PROCEDURE — 1170F FXNL STATUS ASSESSED: CPT | Performed by: INTERNAL MEDICINE

## 2023-02-27 PROCEDURE — 99213 OFFICE O/P EST LOW 20 MIN: CPT | Performed by: INTERNAL MEDICINE

## 2023-02-27 PROCEDURE — 1159F MED LIST DOCD IN RCRD: CPT | Performed by: INTERNAL MEDICINE

## 2023-02-27 PROCEDURE — G0439 PPPS, SUBSEQ VISIT: HCPCS | Performed by: INTERNAL MEDICINE

## 2023-02-27 NOTE — PROGRESS NOTES
The ABCs of the Annual Wellness Visit  Subsequent Medicare Wellness Visit    Subjective    oLri Tony is a 72 y.o. female who presents for a Subsequent Medicare Wellness Visit.    The following portions of the patient's history were reviewed and   updated as appropriate: allergies, current medications, past family history, past medical history, past social history, past surgical history and problem list.    Compared to one year ago, the patient feels her physical   health is the same.    Compared to one year ago, the patient feels her mental   health is the same.    Recent Hospitalizations:  She was not admitted to the hospital during the last year.       Current Medical Providers:  Patient Care Team:  Tabitha French MD as PCP - General (Internal Medicine)  Ana Chiu MD as Surgeon (General Surgery)  Sudeep Roy MD as Consulting Physician (Infectious Diseases)    Outpatient Medications Prior to Visit   Medication Sig Dispense Refill   • cholecalciferol (VITAMIN D3) 25 MCG (1000 UT) tablet Take 1,000 Units by mouth Daily.     • ondansetron (ZOFRAN) 4 MG tablet Take 1 tablet by mouth Every 6 (Six) Hours As Needed for Vomiting or Nausea. 12 tablet 0     No facility-administered medications prior to visit.       No opioid medication identified on active medication list. I have reviewed chart for other potential  high risk medication/s and harmful drug interactions in the elderly.          Aspirin is not on active medication list.  Aspirin use is not indicated based on review of current medical condition/s. Risk of harm outweighs potential benefits.  .    Patient Active Problem List   Diagnosis   • HLD (hyperlipidemia)   • Avitaminosis D   • Heterozygous factor V Leiden mutation (HCC)     Advance Care Planning  Advance Directive is not on file.  ACP discussion was held with the patient during this visit. Patient has an advance directive (not in EMR), copy requested.     Objective    Vitals:     "02/27/23 0808   BP: 132/74   Pulse: 72   Weight: 52.6 kg (116 lb)   Height: 165.1 cm (65\")     Estimated body mass index is 19.3 kg/m² as calculated from the following:    Height as of this encounter: 165.1 cm (65\").    Weight as of this encounter: 52.6 kg (116 lb).    BMI is within normal parameters. No other follow-up for BMI required.      Does the patient have evidence of cognitive impairment? No    Lab Results   Component Value Date    CHLPL 205 (H) 02/20/2023    TRIG 112 02/20/2023    HDL 49 02/20/2023     (H) 02/20/2023    VLDL 20 02/20/2023        HEALTH RISK ASSESSMENT    Smoking Status:  Social History     Tobacco Use   Smoking Status Never   Smokeless Tobacco Never     Alcohol Consumption:  Social History     Substance and Sexual Activity   Alcohol Use Yes   • Alcohol/week: 2.0 standard drinks   • Types: 2 Glasses of wine per week    Comment: Maybe     Fall Risk Screen:    HOMA Fall Risk Assessment was completed, and patient is at LOW risk for falls.Assessment completed on:2/27/2023    Depression Screening:  PHQ-2/PHQ-9 Depression Screening 2/27/2023   Little Interest or Pleasure in Doing Things 0-->not at all   Feeling Down, Depressed or Hopeless 0-->not at all   PHQ-9: Brief Depression Severity Measure Score 0       Health Habits and Functional and Cognitive Screening:  Functional & Cognitive Status 2/27/2023   Do you have difficulty preparing food and eating? No   Do you have difficulty bathing yourself, getting dressed or grooming yourself? No   Do you have difficulty using the toilet? No   Do you have difficulty moving around from place to place? No   Do you have trouble with steps or getting out of a bed or a chair? No   Current Diet Well Balanced Diet   Dental Exam Up to date   Eye Exam Up to date   Exercise (times per week) 7 times per week   Current Exercises Include Walking;Tennis;Swim Aerobics Class   Current Exercise Activities Include -   Do you need help using the phone?  No   Are " you deaf or do you have serious difficulty hearing?  No   Do you need help with transportation? No   Do you need help shopping? No   Do you need help preparing meals?  No   Do you need help with housework?  No   Do you need help with laundry? No   Do you need help taking your medications? No   Do you need help managing money? No   Do you ever drive or ride in a car without wearing a seat belt? No   Have you felt unusual stress, anger or loneliness in the last month? No   Who do you live with? Alone   If you need help, do you have trouble finding someone available to you? No   Have you been bothered in the last four weeks by sexual problems? No   Do you have difficulty concentrating, remembering or making decisions? No       Age-appropriate Screening Schedule:  Refer to the list below for future screening recommendations based on patient's age, sex and/or medical conditions. Orders for these recommended tests are listed in the plan section. The patient has been provided with a written plan.    Health Maintenance   Topic Date Due   • Pneumococcal Vaccine 65+ (1 - PCV) Never done   • ZOSTER VACCINE (2 of 2) 08/09/2017   • TDAP/TD VACCINES (2 - Td or Tdap) 01/01/2020   • DXA SCAN  01/19/2023   • MAMMOGRAM  04/27/2023   • LIPID PANEL  02/20/2024   • ANNUAL WELLNESS VISIT  02/27/2024   • COLORECTAL CANCER SCREENING  01/06/2025   • HEPATITIS C SCREENING  Completed   • COVID-19 Vaccine  Completed   • INFLUENZA VACCINE  Completed   • PAP SMEAR  Discontinued                CMS Preventative Services Quick Reference  Risk Factors Identified During Encounter  Immunizations Discussed/Encouraged: Pneumococcal 23 and Prevnar 20 (Pneumococcal 20-valent conjugate)  The above risks/problems have been discussed with the patient.  Pertinent information has been shared with the patient in the After Visit Summary.  An After Visit Summary and PPPS were made available to the patient.    Follow Up:   Next Medicare Wellness visit to be  "scheduled in 1 year.       Additional E&M Note during same encounter follows:  Patient has multiple medical problems which are significant and separately identifiable that require additional work above and beyond the Medicare Wellness Visit.      Chief Complaint  Medicare Wellness-subsequent    Subjective        HPI  Lori Tony is also being seen today for hyperlipidemia.  She has changed her diet quiet a bit to help lower it.  Quit ice cream and sweets at home- added fish, more fruit.    Thinks weight is down due to recent viral illness - was seen at [facility].   She hasn't been watching other things in diet- dimitrios protein.  She plays tennis often.   Going to see podiatrist-   Got hearing aides- likes having them   Looking for therapist-son just , stressors, etc.     Review of Systems   Constitutional: Negative for activity change.   HENT: Negative for hearing loss.    Eyes: Negative for visual disturbance.   Respiratory: Negative for cough and shortness of breath.    Cardiovascular: Negative for chest pain and leg swelling.   Gastrointestinal: Negative for abdominal pain.   Genitourinary: Negative for difficulty urinating.   Musculoskeletal: Negative for arthralgias, back pain and gait problem.   Skin: Negative for rash.   Neurological: Negative for dizziness.   Psychiatric/Behavioral: Negative for dysphoric mood and sleep disturbance.       Objective   Vital Signs:  /74   Pulse 72   Ht 165.1 cm (65\")   Wt 52.6 kg (116 lb)   BMI 19.30 kg/m²     Physical Exam  Constitutional:       Appearance: Normal appearance.   Cardiovascular:      Rate and Rhythm: Normal rate and regular rhythm.   Pulmonary:      Effort: Pulmonary effort is normal.      Breath sounds: Normal breath sounds.   Chest:   Breasts:     Right: Normal.      Left: Normal.      Comments: Breasts are dense, no discrete masses  Abdominal:      General: Abdomen is flat. Bowel sounds are normal.      Palpations: Abdomen is soft. "   Musculoskeletal:      Right lower leg: No edema.      Left lower leg: No edema.   Lymphadenopathy:      Cervical: No cervical adenopathy.   Psychiatric:         Mood and Affect: Mood normal.         Thought Content: Thought content normal.          The following data was reviewed by: Tabitha French MD on 02/27/2023:  Common labs    Common Labs 2/20/23 2/20/23 2/20/23    0753 [id number] 0753   Glucose  97    BUN  12    Creatinine  0.83    Sodium  138    Potassium  3.8    Chloride  100    Calcium  9.4    Total Protein  6.8    Albumin  4.3    Total Bilirubin  0.6    Alkaline Phosphatase  81    AST (SGOT)  15    ALT (SGPT)  10    WBC   8.43   Hemoglobin   13.2   Hematocrit   39.6   Platelets   231   Total Cholesterol 205 (A)     Triglycerides 112     HDL Cholesterol 49     LDL Cholesterol  136 (A)     (A) Abnormal value       Comments are available for some flowsheets but are not being displayed.                      Assessment and Plan   Diagnoses and all orders for this visit:    1. Other specified disorders of bone density and structure, other site (Primary)  Comments:  check dexa-   Orders:  -     DEXA Bone Density Axial    2. Dense breast tissue on mammogram  Comments:  reviewed MRI vs u/s - will start with u/s and reassess.   Orders:  -     US Breast Bilateral Complete    3. Visit for screening mammogram  -     Mammo Screening Bilateral With CAD    4. Need for pneumococcal vaccination  Comments:  refuses today- will consider in 1 year.     5. Medicare annual wellness visit, subsequent  Comments:  Doing great overall- active, eats well, etc. she is reluctant on vaccines, discussed in full.  She will consider  Check dexa, etc.              Follow Up   Return in about 1 year (around 2/27/2024) for Medicare Wellness, Lab Before FUP.  Patient was given instructions and counseling regarding her condition or for health maintenance advice. Please see specific information pulled into the AVS if appropriate.

## 2023-03-01 ENCOUNTER — TRANSCRIBE ORDERS (OUTPATIENT)
Dept: ADMINISTRATIVE | Facility: HOSPITAL | Age: 73
End: 2023-03-01
Payer: MEDICARE

## 2023-03-05 DIAGNOSIS — Z12.31 VISIT FOR SCREENING MAMMOGRAM: Primary | ICD-10-CM

## 2023-03-05 DIAGNOSIS — R92.2 DENSE BREAST TISSUE ON MAMMOGRAM: ICD-10-CM

## 2023-03-08 ENCOUNTER — TELEPHONE (OUTPATIENT)
Dept: INTERNAL MEDICINE | Facility: CLINIC | Age: 73
End: 2023-03-08

## 2023-03-08 NOTE — TELEPHONE ENCOUNTER
Caller: Lori Tony    Relationship: Self    Best call back number: [phone]    What is the best time to reach you: PLEASE USE [employer]     Who are you requesting to speak with (clinical staff, provider,  specific staff member): CLINICAL    Do you know the name of the person who called: N/A    What was the call regarding: PATIENT STATES THAT SHE TOOK DOWN THE NAMES OF 3 MENTAL HEALTH PROVIDERS FROM DR MAKI BUT CANNOT FIND THEIR INFO ONLINE. WOULD LIKE A [employer] MESSAGE WITH THEIR INFORMATION.     Do you require a callback: NO

## 2023-04-07 ENCOUNTER — TELEPHONE (OUTPATIENT)
Dept: INTERNAL MEDICINE | Facility: CLINIC | Age: 73
End: 2023-04-07

## 2023-04-07 NOTE — TELEPHONE ENCOUNTER
Caller: Lori Tony    Relationship: Self    Best call back number: [phone]    Who are you requesting to speak with (clinical staff, provider,  specific staff member):  DENYS     What was the call regarding: PATIENT STATES THAT SHE SPOKE WITH DENYS IN THE OFFICE ON 03/08 ABOUT BEING BILLED FOR [facility] PATIENT STATES THAT DENYS HAD ADVISED HER THAT SHE WOULD TAKE CARE OF THIS, DUE TO IT NOT BEING BILLED AS AN ANNUAL PHYSICAL THAT MEDICARE WOULD PAY FOR. PATIENT STATES THAT SHE RECEIVED A BILL IN THE MAIL FOR $123.99, SHE WOULD LIKE TO KNOW IF THIS ISSUE WAS EVER TAKEN CARE OF. SHE STATES THAT THIS SHOULD ALL BE COVERED UNDER HER MEDICARE PLAN.    Do you require a callback: YES

## 2023-04-11 ENCOUNTER — TELEPHONE (OUTPATIENT)
Dept: INTERNAL MEDICINE | Facility: CLINIC | Age: 73
End: 2023-04-11
Payer: MEDICARE

## 2023-04-13 ENCOUNTER — TELEPHONE (OUTPATIENT)
Dept: INTERNAL MEDICINE | Facility: CLINIC | Age: 73
End: 2023-04-13
Payer: MEDICARE

## 2023-04-13 NOTE — TELEPHONE ENCOUNTER
I spoke with patient regarding her balance that she requested be written off.   This request has been denied due to the fact the patient did have an office visit along with her MWV.  Explained to the patient the supplemental insurance was billed, however, it appears they do not cover the Medicare deductible, which is standard for most Medicare supplements.  I apologized to the patient for her inconvenience with this.

## 2023-04-13 NOTE — TELEPHONE ENCOUNTER
Patient has been informed that codes have been added for [facility] bill and resubmitted.  I have also requested her office visit in Feb to be written off due to patient not being told when an office visit is done at the same time as a MWV, there may be extra charges. Patient verbalized understanding and stated that she will also be more diligent with how  this is scheduled for next years MWV.

## 2023-05-01 ENCOUNTER — HOSPITAL ENCOUNTER (OUTPATIENT)
Dept: MAMMOGRAPHY | Facility: HOSPITAL | Age: 73
Discharge: HOME | End: 2023-05-01
Admitting: INTERNAL MEDICINE
Payer: MEDICARE

## 2023-05-01 DIAGNOSIS — Z12.31 VISIT FOR SCREENING MAMMOGRAM: ICD-10-CM

## 2023-05-01 PROCEDURE — 77067 SCR MAMMO BI INCL CAD: CPT

## 2023-05-01 PROCEDURE — 77063 BREAST TOMOSYNTHESIS BI: CPT

## 2023-06-08 ENCOUNTER — HOSPITAL ENCOUNTER (OUTPATIENT)
Dept: BONE DENSITY | Facility: HOSPITAL | Age: 73
Discharge: HOME | End: 2023-06-08
Admitting: INTERNAL MEDICINE
Payer: MEDICARE

## 2023-06-08 PROCEDURE — 77080 DXA BONE DENSITY AXIAL: CPT

## 2024-02-19 DIAGNOSIS — Z00.00 MEDICARE ANNUAL WELLNESS VISIT, SUBSEQUENT: Primary | ICD-10-CM

## 2024-02-22 LAB
ALBUMIN SERPL-MCNC: 4.1 G/DL (ref 3.5–5.2)
ALBUMIN/GLOB SERPL: 1.6 G/DL
ALP SERPL-CCNC: 73 U/L (ref 39–117)
ALT SERPL-CCNC: 14 U/L (ref 1–33)
AST SERPL-CCNC: 17 U/L (ref 1–32)
BASOPHILS # BLD AUTO: 0.02 10*3/MM3 (ref 0–0.2)
BASOPHILS NFR BLD AUTO: 0.5 % (ref 0–1.5)
BILIRUB SERPL-MCNC: 0.5 MG/DL (ref 0–1.2)
BUN SERPL-MCNC: 19 MG/DL (ref 8–23)
BUN/CREAT SERPL: 23.2 (ref 7–25)
CALCIUM SERPL-MCNC: 9.1 MG/DL (ref 8.6–10.5)
CHLORIDE SERPL-SCNC: 105 MMOL/L (ref 98–107)
CHOLEST SERPL-MCNC: 229 MG/DL (ref 0–200)
CO2 SERPL-SCNC: 26.3 MMOL/L (ref 22–29)
CREAT SERPL-MCNC: 0.82 MG/DL (ref 0.57–1)
EGFRCR SERPLBLD CKD-EPI 2021: 76.1 ML/MIN/1.73
EOSINOPHIL # BLD AUTO: 0.05 10*3/MM3 (ref 0–0.4)
EOSINOPHIL NFR BLD AUTO: 1.3 % (ref 0.3–6.2)
ERYTHROCYTE [DISTWIDTH] IN BLOOD BY AUTOMATED COUNT: 12.9 % (ref 12.3–15.4)
GLOBULIN SER CALC-MCNC: 2.6 GM/DL
GLUCOSE SERPL-MCNC: 99 MG/DL (ref 65–99)
HCT VFR BLD AUTO: 39.2 % (ref 34–46.6)
HDLC SERPL-MCNC: 54 MG/DL (ref 40–60)
HGB BLD-MCNC: 13.1 G/DL (ref 12–15.9)
IMM GRANULOCYTES # BLD AUTO: 0.01 10*3/MM3 (ref 0–0.05)
IMM GRANULOCYTES NFR BLD AUTO: 0.3 % (ref 0–0.5)
LDLC SERPL CALC-MCNC: 155 MG/DL (ref 0–100)
LYMPHOCYTES # BLD AUTO: 1.55 10*3/MM3 (ref 0.7–3.1)
LYMPHOCYTES NFR BLD AUTO: 39.5 % (ref 19.6–45.3)
MCH RBC QN AUTO: 32 PG (ref 26.6–33)
MCHC RBC AUTO-ENTMCNC: 33.4 G/DL (ref 31.5–35.7)
MCV RBC AUTO: 95.6 FL (ref 79–97)
MONOCYTES # BLD AUTO: 0.36 10*3/MM3 (ref 0.1–0.9)
MONOCYTES NFR BLD AUTO: 9.2 % (ref 5–12)
NEUTROPHILS # BLD AUTO: 1.93 10*3/MM3 (ref 1.7–7)
NEUTROPHILS NFR BLD AUTO: 49.2 % (ref 42.7–76)
NRBC BLD AUTO-RTO: 0 /100 WBC (ref 0–0.2)
PLATELET # BLD AUTO: 237 10*3/MM3 (ref 140–450)
POTASSIUM SERPL-SCNC: 4.2 MMOL/L (ref 3.5–5.2)
PROT SERPL-MCNC: 6.7 G/DL (ref 6–8.5)
RBC # BLD AUTO: 4.1 10*6/MM3 (ref 3.77–5.28)
SODIUM SERPL-SCNC: 143 MMOL/L (ref 136–145)
TRIGL SERPL-MCNC: 112 MG/DL (ref 0–150)
VLDLC SERPL CALC-MCNC: 20 MG/DL (ref 5–40)
WBC # BLD AUTO: 3.92 10*3/MM3 (ref 3.4–10.8)

## 2024-02-29 ENCOUNTER — OFFICE VISIT (OUTPATIENT)
Dept: INTERNAL MEDICINE | Facility: CLINIC | Age: 74
End: 2024-02-29
Payer: MEDICARE

## 2024-02-29 VITALS
DIASTOLIC BLOOD PRESSURE: 80 MMHG | HEIGHT: 65 IN | SYSTOLIC BLOOD PRESSURE: 114 MMHG | BODY MASS INDEX: 19.66 KG/M2 | WEIGHT: 118 LBS | HEART RATE: 74 BPM

## 2024-02-29 DIAGNOSIS — Z00.00 MEDICARE ANNUAL WELLNESS VISIT, SUBSEQUENT: ICD-10-CM

## 2024-02-29 DIAGNOSIS — E55.9 AVITAMINOSIS D: Chronic | ICD-10-CM

## 2024-02-29 DIAGNOSIS — E78.2 MIXED HYPERLIPIDEMIA: Primary | Chronic | ICD-10-CM

## 2024-03-04 ENCOUNTER — TELEPHONE (OUTPATIENT)
Dept: INTERNAL MEDICINE | Facility: CLINIC | Age: 74
End: 2024-03-04
Payer: MEDICARE

## 2024-09-09 NOTE — TELEPHONE ENCOUNTER
RN COVID TREATMENT VISIT  09/09/24      The patient has been triaged and does not require a higher level of care.    Shital Krueger  54 year old  Current weight? 298    Has the patient been seen by a primary care provider at an [facility] or [facility] within the past two years? Yes.   Have you been in close proximity to/do you have a known exposure to a person with a confirmed case of influenza? No.     General treatment eligibility:  Date of positive COVID test (PCR or at home)?  9/7/24    Are you or have you been hospitalized for this COVID-19 infection? No.   Have you received monoclonal antibodies or antiviral treatment for COVID-19 since this positive test? No.   Do you have any of the following conditions that place you at risk of being very sick from COVID-19?   - Age 50 years or older  - Overweight or Obesity (BMI >85th percentile or BMI 25 or higher)  Yes, patient has at least one high risk condition as noted above.     Current COVID symptoms:   - cough  - fatigue  - muscle or body aches  - congestion or runny nose  Yes. Patient has at least one symptom as selected.     How many days since symptoms started? 5 days or less. Established patient, 12 years or older weighing at least 88.2 lbs, who has symptoms that started in the past 5 days, has not been hospitalized nor received treatment already, and is at risk for being very sick from COVID-19.     Treatment eligibility by RN:  Are you currently pregnant or nursing? No  Do you have a clinically significant hypersensitivity to nirmatrelvir or ritonavir, or toxic epidermal necrolysis (TEN) or Hogue-Juanjose Syndrome? No  Do you have a history of hepatitis, any hepatic impairment on the Problem List (such as Child-Aguila Class C, cirrhosis, fatty liver disease, alcoholic liver disease), or was the last liver lab (hepatic panel, ALT, AST, ALK Phos, bilirubin) elevated in the past 6 months? No  Do you have any history of severe  error   renal impairment (eGFR < 30mL/min)? No    Is patient eligible to continue? Yes, patient meets all eligibility requirements for the RN COVID treatment (as denoted by all no responses above).     Current Outpatient Medications   Medication Sig Dispense Refill    bisacodyl (DULCOLAX) 5 MG EC tablet Two days prior to exam take two (2) tablets at 4pm. One day prior to exam take two (2) tablets at 4pm 4 tablet 0    celecoxib (CELEBREX) 200 MG capsule 200 mg      clobetasol (TEMOVATE) 0.05 % CREA cream       fluticasone (FLONASE) 50 MCG/ACT nasal spray INSTILL 1 SPRAY INTO BOTH NOSTRILS DAILY 16 mL 11    hydrochlorothiazide (HYDRODIURIL) 25 MG tablet Take 1 tablet (25 mg) by mouth daily as needed (edema) 90 tablet 2    loratadine (CLARITIN) 10 MG tablet Take 10 mg by mouth daily      losartan (COZAAR) 50 MG tablet Take 1 tablet (50 mg) by mouth daily 90 tablet 1    metroNIDAZOLE (METROCREAM) 0.75 % external cream Apply topically 2 times daily Apply to face for rosacea 45 g 1    nystatin (MYCOSTATIN) 021219 UNIT/GM external powder Apply topically 2 times daily as needed for other (for rash to breast) Apply to crease of breast two times per day x 7-10 as needed for rash 60 g 0    Semaglutide-Weight Management (WEGOVY) 0.25 MG/0.5ML pen Inject 0.25 mg Subcutaneous every 7 days 2 mL 0    Semaglutide-Weight Management (WEGOVY) 0.5 MG/0.5ML pen Inject 0.5 mg Subcutaneous every 7 days 2 mL 1       Medications from List 1 of the standing order (on medications that exclude the use of Paxlovid) that patient is taking: NONE. Is patient taking Joplin's Wort? No  Is patient taking Joplin's Wort or any meds from List 1? No.   Medications from List 2 of the standing order (on meds that provider needs to adjust) that patient is taking: NONE. Is patient on any of the meds from List 2? No.   Medications from List 3 of standing order (on meds that a RN needs to adjust) that patient is taking: NONE. Is patient on any meds from List 3? No.      Paxlovid has an approximate 90% reduction in hospitalization. Paxlovid can possibly cause altered sense of taste, diarrhea (loose, watery stools), high blood pressure, muscle aches.     Would patient like a Paxlovid prescription?   Yes.   Lab Results   Component Value Date    GFRESTIMATED >90 03/20/2024       Was last eGFR reduced? No, eGFR 60 or greater/ No Result on record. Patient can receive the normal renal function dose. Paxlovid Rx sent to [facility]       Temporary change to home medications: None    All medication adjustments (holds, etc) were discussed with the patient and patient was asked to repeat back (teachback) their med adjustment.  Did patient understand med adjustment? No medication adjustments needed.         Reviewed the following instructions with the patient:    Paxlovid (nimatrelvir and ritonavir)    How it works  Two medicines (nirmatrelvir and ritonavir) are taken together. They stop the virus from growing. Less amount of virus is easier for your body to fight.    How to take  Medicine comes in a daily container with both medicine tablets. Take by mouth twice daily (once in the morning, once at night) for 5 days.  The number of tablets to take varies by patient.  Don't chew or break capsules. Swallow whole.    When to take  Take as soon as possible after positive COVID-19 test result, and within 5 days of your first symptoms.    Possible side effects  Can cause altered sense of taste, diarrhea (loose, watery stools), high blood pressure, muscle aches.    Radha Rutledge RN           Additional Information   Negative: SEVERE difficulty breathing (e.g., struggling for each breath, speaks in single words)   Negative: Difficult to awaken or acting confused (e.g., disoriented, slurred speech)   Negative: Bluish (or gray) lips or face now   Negative: Shock suspected (e.g., cold/pale/clammy skin, too weak to stand, low BP, rapid pulse)   Negative: Sounds like a life-threatening  emergency to the triager   Negative: Diagnosed or suspected COVID-19 and symptoms lasting 3 or more weeks   Negative: COVID-19 exposure and no symptoms   Negative: COVID-19 vaccine reaction suspected (e.g., fever, headache, muscle aches) occurring 1 to 3 days after getting vaccine   Negative: COVID-19 vaccine, questions about   Negative: Lives with someone known to have influenza (flu test positive) and flu-like symptoms (e.g., cough, runny nose, sore throat, SOB; with or without fever)   Negative: Possible COVID-19 symptoms and triager concerned about severity of symptoms or other causes   Negative: COVID-19 and breastfeeding, questions about   Negative: SEVERE or constant chest pain or pressure  (Exception: Mild central chest pain, present only when coughing.)   Negative: MODERATE difficulty breathing (e.g., speaks in phrases, SOB even at rest, pulse 100-120)   Negative: Headache and stiff neck (can't touch chin to chest)   Negative: Oxygen level (e.g., pulse oximetry) 90% or lower   Negative: Chest pain or pressure  (Exception: MILD central chest pain, present only when coughing.)   Negative: Drinking very little and dehydration suspected (e.g., no urine > 12 hours, very dry mouth, very lightheaded)   Negative: Patient sounds very sick or weak to the triager   Negative: MILD difficulty breathing (e.g., minimal/no SOB at rest, SOB with walking, pulse <100)   Negative: Fever > 103 F (39.4 C)   Negative: Fever > 101 F (38.3 C) and over 60 years of age   Negative: Fever > 100.0 F (37.8 C) and bedridden (e.g., CVA, chronic illness, recovering from surgery)   Negative: HIGH RISK patient (e.g., weak immune system, age > 64 years, obesity with BMI of 30 or higher, pregnant, chronic lung disease or other chronic medical condition) and COVID symptoms (e.g., cough, fever)  (Exceptions: Already seen by doctor or NP/PA and no new or worsening symptoms.)   Negative: HIGH RISK patient and influenza is widespread in the  "community and ONE OR MORE respiratory symptoms: cough, sore throat, runny or stuffy nose   Negative: HIGH RISK patient and influenza exposure within the last 7 days and ONE OR MORE respiratory symptoms: cough, sore throat, runny or stuffy nose   Negative: Oxygen level (e.g., pulse oximetry) 91 to 94%   Negative: COVID-19 infection suspected by caller or triager and mild symptoms (cough, fever, or others) and negative COVID-19 rapid test   Negative: Fever present > 3 days (72 hours)   Negative: Fever returns after gone for over 24 hours and symptoms worse or not improved   Negative: Continuous (nonstop) coughing interferes with work or school and no improvement using cough treatment per Care Advice   Negative: Cough present > 3 weeks    Answer Assessment - Initial Assessment Questions  1. COVID-19 DIAGNOSIS: \"How do you know that you have COVID?\" (e.g., positive lab test or self-test, diagnosed by doctor or NP/PA, symptoms after exposure).      9/7/24  2. COVID-19 EXPOSURE: \"Was there any known exposure to COVID before the symptoms began?\" CDC Definition of close contact: within 6 feet (2 meters) for a total of 15 minutes or more over a 24-hour period.       Yes, wedding   3. ONSET: \"When did the COVID-19 symptoms start?\"       9/6/24  4. WORST SYMPTOM: \"What is your worst symptom?\" (e.g., cough, fever, shortness of breath, muscle aches)      Sinus pain, fatigue, aches  5. COUGH: \"Do you have a cough?\" If Yes, ask: \"How bad is the cough?\"        Slight   6. FEVER: \"Do you have a fever?\" If Yes, ask: \"What is your temperature, how was it measured, and when did it start?\"      99.5  7. RESPIRATORY STATUS: \"Describe your breathing?\" (e.g., normal; shortness of breath, wheezing, unable to speak)       Normal   8. BETTER-SAME-WORSE: \"Are you getting better, staying the same or getting worse compared to yesterday?\"  If getting worse, ask, \"In what way?\"      Same   9. OTHER SYMPTOMS: \"Do you have any other symptoms?\"  " "(e.g., chills, fatigue, headache, loss of smell or taste, muscle pain, sore throat)      See above, chills Saturday, headache   10. HIGH RISK DISEASE: \"Do you have any chronic medical problems?\" (e.g., asthma, heart or lung disease, weak immune system, obesity, etc.)        Obesity   11. VACCINE: \"Have you had the COVID-19 vaccine?\" If Yes, ask: \"Which one, how many shots, when did you get it?\"        Yes   12. PREGNANCY: \"Is there any chance you are pregnant?\" \"When was your last menstrual period?\"        N/a   13. O2 SATURATION MONITOR:  \"Do you use an oxygen saturation monitor (pulse oximeter) at home?\" If Yes, ask \"What is your reading (oxygen level) today?\" \"What is your usual oxygen saturation reading?\" (e.g., 95%)        Not checked    Protocols used: Coronavirus (COVID-19) Diagnosed or [id number]    "

## 2025-02-26 DIAGNOSIS — E78.2 MIXED HYPERLIPIDEMIA: ICD-10-CM

## 2025-02-26 DIAGNOSIS — Z00.00 MEDICARE ANNUAL WELLNESS VISIT, SUBSEQUENT: Primary | ICD-10-CM

## 2025-02-26 LAB
ALBUMIN SERPL-MCNC: 4.1 G/DL (ref 3.5–5.2)
ALBUMIN/GLOB SERPL: 1.6 G/DL
ALP SERPL-CCNC: 72 U/L (ref 39–117)
ALT SERPL-CCNC: 16 U/L (ref 1–33)
AST SERPL-CCNC: 24 U/L (ref 1–32)
BILIRUB SERPL-MCNC: 0.6 MG/DL (ref 0–1.2)
BUN SERPL-MCNC: 16 MG/DL (ref 8–23)
BUN/CREAT SERPL: 17.8 (ref 7–25)
CALCIUM SERPL-MCNC: 9.4 MG/DL (ref 8.6–10.5)
CHLORIDE SERPL-SCNC: 105 MMOL/L (ref 98–107)
CHOLEST SERPL-MCNC: 241 MG/DL (ref 0–200)
CO2 SERPL-SCNC: 25.3 MMOL/L (ref 22–29)
CREAT SERPL-MCNC: 0.9 MG/DL (ref 0.57–1)
EGFRCR SERPLBLD CKD-EPI 2021: 67.6 ML/MIN/1.73
ERYTHROCYTE [DISTWIDTH] IN BLOOD BY AUTOMATED COUNT: 13.1 % (ref 12.3–15.4)
GLOBULIN SER CALC-MCNC: 2.5 GM/DL
GLUCOSE SERPL-MCNC: 92 MG/DL (ref 65–99)
HCT VFR BLD AUTO: 38.5 % (ref 34–46.6)
HDLC SERPL-MCNC: 57 MG/DL (ref 40–60)
HGB BLD-MCNC: 12.9 G/DL (ref 12–15.9)
LDLC SERPL CALC-MCNC: 171 MG/DL (ref 0–100)
MCH RBC QN AUTO: 31.7 PG (ref 26.6–33)
MCHC RBC AUTO-ENTMCNC: 33.5 G/DL (ref 31.5–35.7)
MCV RBC AUTO: 94.6 FL (ref 79–97)
PLATELET # BLD AUTO: 235 10*3/MM3 (ref 140–450)
POTASSIUM SERPL-SCNC: 4.8 MMOL/L (ref 3.5–5.2)
PROT SERPL-MCNC: 6.6 G/DL (ref 6–8.5)
RBC # BLD AUTO: 4.07 10*6/MM3 (ref 3.77–5.28)
SODIUM SERPL-SCNC: 140 MMOL/L (ref 136–145)
TRIGL SERPL-MCNC: 78 MG/DL (ref 0–150)
VLDLC SERPL CALC-MCNC: 13 MG/DL (ref 5–40)
WBC # BLD AUTO: 4.25 10*3/MM3 (ref 3.4–10.8)

## 2025-03-05 ENCOUNTER — OFFICE VISIT (OUTPATIENT)
Dept: INTERNAL MEDICINE | Facility: CLINIC | Age: 75
End: 2025-03-05
Payer: MEDICARE

## 2025-03-05 VITALS
SYSTOLIC BLOOD PRESSURE: 108 MMHG | BODY MASS INDEX: 20.33 KG/M2 | WEIGHT: 122 LBS | HEIGHT: 65 IN | HEART RATE: 74 BPM | DIASTOLIC BLOOD PRESSURE: 64 MMHG

## 2025-03-05 DIAGNOSIS — Z12.11 ENCOUNTER FOR SCREENING FOR MALIGNANT NEOPLASM OF COLON: ICD-10-CM

## 2025-03-05 DIAGNOSIS — Z00.00 MEDICARE ANNUAL WELLNESS VISIT, SUBSEQUENT: Primary | ICD-10-CM

## 2025-03-05 DIAGNOSIS — D12.6 ADENOMATOUS POLYP OF COLON, UNSPECIFIED PART OF COLON: ICD-10-CM

## 2025-03-05 DIAGNOSIS — E78.2 MIXED HYPERLIPIDEMIA: Chronic | ICD-10-CM

## 2025-03-05 DIAGNOSIS — Z71.85 VACCINE COUNSELING: ICD-10-CM

## 2025-03-05 PROBLEM — L90.0 LICHEN SCLEROSUS: Status: ACTIVE | Noted: 2025-03-05

## 2025-03-05 RX ORDER — HALOBETASOL PROPIONATE 0.05 %
OINTMENT (GRAM) TOPICAL
COMMUNITY
Start: 2024-04-01

## 2025-03-05 NOTE — PROGRESS NOTES
" Subjective   The ABCs of the Annual Wellness Visit  Medicare Wellness Visit      Lori Tony is a 74 y.o. patient who presents for a Medicare Wellness Visit.    The following portions of the patient's history were reviewed and   updated as appropriate: allergies, current medications, past family history, past medical history, past social history, past surgical history, and problem list.    Compared to one year ago, the patient's physical   health is the same.  Compared to one year ago, the patient's mental   health is the same.    Recent Hospitalizations:  She was not admitted to the hospital during the last year.     Current Medical Providers:  Patient Care Team:  Tabitha French MD as PCP - General (Internal Medicine)  Sudeep Roy MD as Consulting Physician (Infectious Diseases)  Rowan James MD as Consulting Physician (Obstetrics and Gynecology)    Outpatient Medications Prior to Visit   Medication Sig Dispense Refill    cholecalciferol (VITAMIN D3) 25 MCG (1000 UT) tablet Take 1 tablet by mouth Daily.      halobetasol (ULTRAVATE) 0.05 % ointment        No facility-administered medications prior to visit.     No opioid medication identified on active medication list. I have reviewed chart for other potential  high risk medication/s and harmful drug interactions in the elderly.      Aspirin is not on active medication list.  Aspirin use is not indicated based on review of current medical condition/s. Risk of harm outweighs potential benefits.  .    Patient Active Problem List   Diagnosis    HLD (hyperlipidemia)    Avitaminosis D    Heterozygous factor V Leiden mutation    Lichen sclerosus     Advance Care Planning Advance Directive is not on file.  ACP discussion was held with the patient during this visit. Patient has an advance directive (not in EMR), copy requested.            Objective   Vitals:    03/05/25 0753   BP: 108/64   Pulse: 74   Weight: 55.3 kg (122 lb)   Height: 165.1 cm (65\") " "      Estimated body mass index is 20.3 kg/m² as calculated from the following:    Height as of this encounter: 165.1 cm (65\").    Weight as of this encounter: 55.3 kg (122 lb).    BMI is within normal parameters. No other follow-up for BMI required.           Does the patient have evidence of cognitive impairment? No  Lab Results   Component Value Date    CHLPL 241 (H) 2025    TRIG 78 2025    HDL 57 2025     (H) 2025    VLDL 13 2025                                                                                                Health  Risk Assessment    Smoking Status:  Social History     Tobacco Use   Smoking Status Never    Passive exposure: Never   Smokeless Tobacco Never     Alcohol Consumption:  Social History     Substance and Sexual Activity   Alcohol Use Yes    Alcohol/week: 2.0 standard drinks of alcohol    Types: 2 Glasses of wine per week    Comment: Maybe       Fall Risk Screen  CARMENADI Fall Risk Assessment was completed, and patient is at LOW risk for falls.Assessment completed on:3/5/2025    Depression Screening   Little interest or pleasure in doing things? Not at all   Feeling down, depressed, or hopeless? Not at all   PHQ-2 Total Score 0      Health Habits and Functional and Cognitive Screenin/26/2025     9:44 AM   Functional & Cognitive Status   Do you have difficulty preparing food and eating? No    Do you have difficulty bathing yourself, getting dressed or grooming yourself? No    Do you have difficulty using the toilet? No    Do you have difficulty moving around from place to place? No    Do you have trouble with steps or getting out of a bed or a chair? No    Current Diet Well Balanced Diet    Dental Exam Up to date    Eye Exam Up to date    Exercise (times per week) 9 times per week    Current Exercises Include Cardiovascular Workout;Hiking;House Cleaning;Light Weights;Pickleball;Running/Jogging;Swimming;Tennis;Walking;Weightlifting;Yard Work    Do " you need help using the phone?  No    Are you deaf or do you have serious difficulty hearing?  No    Do you need help to go to places out of walking distance? No    Do you need help shopping? No    Do you need help preparing meals?  No    Do you need help with housework?  No    Do you need help with laundry? No    Do you need help taking your medications? No    Do you need help managing money? No    Do you ever drive or ride in a car without wearing a seat belt? No    Have you felt unusual stress, anger or loneliness in the last month? No    Who do you live with? Spouse    If you need help, do you have trouble finding someone available to you? No    Have you been bothered in the last four weeks by sexual problems? No    Do you have difficulty concentrating, remembering or making decisions? No        Patient-reported           Age-appropriate Screening Schedule:  Refer to the list below for future screening recommendations based on patient's age, sex and/or medical conditions. Orders for these recommended tests are listed in the plan section. The patient has been provided with a written plan.    Health Maintenance List  Health Maintenance   Topic Date Due    Pneumococcal Vaccine 50+ (1 of 1 - PCV) Never done    ZOSTER VACCINE (2 of 2) 08/09/2017    TDAP/TD VACCINES (2 - Td or Tdap) 01/01/2020    MAMMOGRAM  05/01/2024    COVID-19 Vaccine (6 - 2024-25 season) 09/01/2024    COLORECTAL CANCER SCREENING  01/06/2025    ANNUAL WELLNESS VISIT  02/28/2025    DXA SCAN  06/08/2025    LIPID PANEL  02/26/2026    HEPATITIS C SCREENING  Completed    INFLUENZA VACCINE  Completed    PAP SMEAR  Discontinued                                                                                                                                                CMS Preventative Services Quick Reference  Risk Factors Identified During Encounter  Immunizations Discussed/Encouraged: Tdap, Prevnar 20 (Pneumococcal 20-valent conjugate), and  "Shingrix    The above risks/problems have been discussed with the patient.  Pertinent information has been shared with the patient in the After Visit Summary.  An After Visit Summary and PPPS were made available to the patient.    Follow Up:   Next Medicare Wellness visit to be scheduled in 1 year.         Additional E&M Note during same encounter follows:  Patient has additional, significant, and separately identifiable condition(s)/problem(s) that require work above and beyond the Medicare Wellness Visit     Chief Complaint  Medicare Wellness-subsequent    Subjective   HPI  Lori is also being seen today for additional medical problem/s.  Saw Dr. James last year for gyn eval- dx lichen sclerosis- treatment has been great. Has mammogram scheduled with her.   Disappointed in her cholesterol- adding oatmeal again. Had dropped LDL down to 130s in the past.   C-scope in the past with precancerous polyps- had 5 year fu in 2020. Due now.   Mother with dementia in late 80s.  Discussed vaccines- she doesn't want to cont to get vaccines-               Objective   Vital Signs:  /64   Pulse 74   Ht 165.1 cm (65\")   Wt 55.3 kg (122 lb)   BMI 20.30 kg/m²   Physical Exam  Constitutional:       Appearance: Normal appearance.   Cardiovascular:      Rate and Rhythm: Normal rate.   Pulmonary:      Effort: Pulmonary effort is normal.   Psychiatric:         Mood and Affect: Mood normal.         The following data was reviewed by: Tabitha French MD on 03/05/2025:     CMP          2/26/2025    07:54   CMP   Glucose 92    BUN 16    Creatinine 0.90    EGFR 67.6    Sodium 140    Potassium 4.8    Chloride 105    Calcium 9.4    Total Protein 6.6    Albumin 4.1    Globulin 2.5    Total Bilirubin 0.6    Alkaline Phosphatase 72    AST (SGOT) 24    ALT (SGPT) 16    Albumin/Globulin Ratio 1.6    BUN/Creatinine Ratio 17.8      CBC          2/26/2025    07:54   CBC   WBC 4.25    RBC 4.07    Hemoglobin 12.9    Hematocrit 38.5    MCV " "94.6    MCH 31.7    MCHC 33.5    RDW 13.1    Platelets 235      Lipid Panel          2/26/2025    07:54   Lipid Panel   Total Cholesterol 241    Triglycerides 78    HDL Cholesterol 57    VLDL Cholesterol 13    LDL Cholesterol  171              Assessment and Plan               Encounter for screening for malignant neoplasm of colon    Adenomatous polyp of colon, unspecified part of colon    Mixed hyperlipidemia     Vaccine counseling    Medicare annual wellness visit, subsequent    Diagnoses and all orders for this visit:    1. Medicare annual wellness visit, subsequent (Primary)  Comments:  exam and history are very favorable- cont gyn f/u- exercise and diet are excellent.    2. Encounter for screening for malignant neoplasm of colon  Comments:  agree to f/u screening-  Orders:  -     Ambulatory Referral For Screening Colonoscopy    3. Adenomatous polyp of colon, unspecified part of colon  -     Ambulatory Referral For Screening Colonoscopy    4. Mixed hyperlipidemia  Comments:  avoiding medication- comfortable with resuming oatmeal daily and recheck 1 year.  Overview:  Impression: 01/21/2015 - She has a nice high HDL cholesterol.  Her LDL is ok.  Impression: 06/30/2014 - Her lipids all look fine.  I have emphasized to her  that her TG are in a normal range and are very sensitive to the fats in her diet.  She has dramatically improved her LDL in lainey last two years.  Her HDL is high and protective.;     Assessment & Plan:       Orders:  -     Comprehensive Metabolic Panel; Future  -     Lipid Panel With / Chol / HDL Ratio; Future  -     TSH; Future    5. Vaccine counseling  Comments:  reviewed recommendations- she is \"uncomfortable\" with vaccines, she never gets sick. would like to avoid, understands risk.               Follow Up   Return in about 1 year (around 3/5/2026) for Medicare Wellness, Lab Before FUP.  Patient was given instructions and counseling regarding her condition or for health maintenance advice. " Please see specific information pulled into the AVS if appropriate.

## 2025-04-02 ENCOUNTER — TELEPHONE (OUTPATIENT)
Dept: INTERNAL MEDICINE | Facility: CLINIC | Age: 75
End: 2025-04-02
Payer: MEDICARE

## 2025-04-02 DIAGNOSIS — Z88.9 ALLERGY HISTORY, DRUG: Primary | ICD-10-CM

## 2025-04-02 NOTE — TELEPHONE ENCOUNTER
D/w Dr. Mihaela mahoney- pts dentist- she needs some dental work done and has noted a history of allergy to novocaine. Uncertain reaction- dentist would like her allergies evaluated so they can proceed. Will make referral.

## 2025-08-26 DIAGNOSIS — H93.19 TINNITUS, UNSPECIFIED LATERALITY: Primary | ICD-10-CM
